# Patient Record
Sex: FEMALE | Race: BLACK OR AFRICAN AMERICAN | Employment: OTHER | URBAN - METROPOLITAN AREA
[De-identification: names, ages, dates, MRNs, and addresses within clinical notes are randomized per-mention and may not be internally consistent; named-entity substitution may affect disease eponyms.]

---

## 2018-03-13 ENCOUNTER — OFFICE VISIT (OUTPATIENT)
Dept: INTERNAL MEDICINE CLINIC | Age: 68
End: 2018-03-13

## 2018-03-13 ENCOUNTER — HOSPITAL ENCOUNTER (OUTPATIENT)
Dept: LAB | Age: 68
Discharge: HOME | End: 2018-03-13

## 2018-03-13 VITALS
RESPIRATION RATE: 16 BRPM | WEIGHT: 188 LBS | BODY MASS INDEX: 28.49 KG/M2 | TEMPERATURE: 98.2 F | HEIGHT: 68 IN | HEART RATE: 49 BPM | DIASTOLIC BLOOD PRESSURE: 75 MMHG | OXYGEN SATURATION: 95 % | SYSTOLIC BLOOD PRESSURE: 119 MMHG

## 2018-03-13 DIAGNOSIS — Z13.1 SCREENING FOR DIABETES MELLITUS: ICD-10-CM

## 2018-03-13 DIAGNOSIS — Z12.11 SCREEN FOR COLON CANCER: ICD-10-CM

## 2018-03-13 DIAGNOSIS — I10 ESSENTIAL HYPERTENSION: Chronic | ICD-10-CM

## 2018-03-13 DIAGNOSIS — Z51.81 MEDICATION MONITORING ENCOUNTER: ICD-10-CM

## 2018-03-13 DIAGNOSIS — E89.0 POSTOPERATIVE HYPOTHYROIDISM: Chronic | ICD-10-CM

## 2018-03-13 DIAGNOSIS — N95.1 MENOPAUSAL STATE: ICD-10-CM

## 2018-03-13 DIAGNOSIS — N95.1 HOT FLASH, MENOPAUSAL: ICD-10-CM

## 2018-03-13 DIAGNOSIS — Z12.31 ENCOUNTER FOR SCREENING MAMMOGRAM FOR BREAST CANCER: ICD-10-CM

## 2018-03-13 DIAGNOSIS — Z13.820 SCREENING FOR OSTEOPOROSIS: ICD-10-CM

## 2018-03-13 DIAGNOSIS — I10 ESSENTIAL HYPERTENSION: Primary | Chronic | ICD-10-CM

## 2018-03-13 DIAGNOSIS — Z83.3 FH: DIABETES MELLITUS: ICD-10-CM

## 2018-03-13 RX ORDER — LEVOTHYROXINE SODIUM 112 UG/1
112 TABLET ORAL
COMMUNITY
Start: 2018-02-16 | End: 2019-03-27 | Stop reason: DRUGHIGH

## 2018-03-13 RX ORDER — HYDROCHLOROTHIAZIDE 12.5 MG/1
12.5 CAPSULE ORAL DAILY
COMMUNITY
Start: 2018-02-16 | End: 2018-09-06 | Stop reason: SDUPTHER

## 2018-03-13 RX ORDER — AMLODIPINE AND BENAZEPRIL HYDROCHLORIDE 5; 20 MG/1; MG/1
1 CAPSULE ORAL DAILY
COMMUNITY
Start: 2018-02-16

## 2018-03-13 NOTE — PROGRESS NOTES
Identified pt with two pt identifiers(name and ). Reviewed record in preparation for visit and have obtained necessary documentation. Chief Complaint   Patient presents with   [address]     pt would like to be tested for DM, as this runs in her family    Migraine     photosensitivity, visual disturbances    Menopause     hot flashes    Stress     causing sleep issues, sleeps maybe 4hrs/night- has trouble falling back asleep        Health Maintenance Due   Topic    Hepatitis C Screening     DTaP/Tdap/Td series (1 - Tdap)    BREAST CANCER SCRN MAMMOGRAM     FOBT Q 1 YEAR AGE 50-75     ZOSTER VACCINE AGE 60>     GLAUCOMA SCREENING Q2Y     Bone Densitometry (Dexa) Screening     Pneumococcal 65+ Low/Medium Risk (1 of 2 - PCV13)    MEDICARE YEARLY EXAM      Coordination of Care Questionnaire:  :   1) Have you been to an emergency room, urgent care clinic since your last visit? no   Hospitalized since your last visit? no             2. Have seen or consulted any other health care provider since your last visit? NO  If yes, where when, and reason for visit? 3) Do you have an Advanced Directive/ Living Will in place? NO  If yes, do we have a copy on file NO  If no, would you like information YES- pt given forms to complete and return to clinic    Patient is accompanied by self I have received verbal consent from Denise Bauer to discuss any/all medical information while they are present in the room.

## 2018-03-13 NOTE — PATIENT INSTRUCTIONS
Learning About Menopause  What is menopause? For most women, menopause is a natural process of aging. Menstrual periods gradually stop. The ability to become pregnant ends. Some women feel relief that their childbearing years are ending. But other women struggle with the physical and emotional changes that come with menopause. For most women, menopause happens around age 48. But every woman's body has its own timeline. Some women stop having periods in their mid-40s. Others keep having periods well into their 50s. And some women go through menopause early because of cancer treatment or surgery to remove the ovaries. What can you expect with menopause? · It starts with perimenopause. This is the process of change that leads up to menopause. Perimenopause can start as early as your late 35s or as late as your early 46s. How long it lasts varies. But it usually lasts from 2 to 8 years. · During this time, your hormone levels will go up and down unevenly (fluctuate). This causes changes in your periods and other symptoms. In time, estrogen and progesterone levels drop enough that the menstrual cycle stops. Going a full year without having a period is usually considered menopause. · Low estrogen levels after menopause speed bone loss. This increases your risk of osteoporosis. Also, your risk of heart disease increases after menopause. · It's normal to have thinner, dryer, wrinkled skin after menopause. The vaginal lining and the lower urinary tract also thin and weaken. This can make it hard to have sex. It can also increase the risk of vaginal and urinary tract infections. What are the symptoms? · Lighter or heavier periods. Your menstrual cycle may be longer or shorter. You may skip periods. · Hot flashes. You may have a sudden feeling of intense body heat. You may sweat, and your head, neck, and chest may get red. Along with hot flashes, you may have a heartbeat that's too fast or not regular.  You may also feel anxious or grouchy. In rare cases you might feel panic. · Trouble sleeping. · Mood swings or feeling grouchy, depressed, or worried. · Problems with remembering or thinking clearly. · Vaginal dryness. Some women have only a few mild symptoms. Others have severe symptoms that disrupt their sleep and daily lives. Symptoms tend to last or get worse the first year or more after menopause. Over time, hormones even out at low levels. Many symptoms improve or go away. But some women may have symptoms that don't go away. How are menopause symptoms treated? ?If you have mild symptoms, you may get some relief if you eat healthy foods, exercise, and lower your stress. Some women choose to take medicines if they have severe symptoms that aren't helped by making changes to their lifestyle. ? Lifestyle changes  ? · Choose a heart-healthy diet that is low in saturated fat. It should include plenty of fish, fruits, vegetables, beans, and high-fiber grains and breads. Be sure you get enough calcium and vitamin D to help your bones stay strong. Low-fat or nonfat dairy products are a great source of calcium. ? · Get regular exercise. Exercise can help you manage your weight, keep your heart and bones strong, and lift your mood. ? · Limit caffeine, alcohol, and stress. These things can make symptoms worse. Limiting them may help you sleep better. ? · If you smoke, stop. Quitting smoking can reduce hot flashes and long-term health risks. Medicines  ? If your symptoms are severe, talk with your doctor. You may want to try prescription medicines, such as:  ? · Low-dose birth control pills before menopause. ? · Low-dose hormone therapy (HT) after menopause. ? · Antidepressants. ? · A medicine called clonidine (Catapres) that is usually used to treat high blood pressure. ? All medicines for menopause symptoms have possible risks or side effects.  A very small number of women develop serious health problems when taking hormone therapy. Be sure to talk to your doctor about your possible health risks before you start a treatment for menopause symptoms. ?Other treatments  ? You can try:  ? · Breathing exercises. They may help reduce hot flashes and emotional symptoms. ? · Soy. Some women feel that eating lots of soy helps even out their menopause symptoms. ? · Yoga or biofeedback. They may help reduce stress. Follow-up care is a key part of your treatment and safety. Be sure to make and go to all appointments, and call your doctor if you are having problems. It's also a good idea to know your test results and keep a list of the medicines you take. Where can you learn more? Go to http://carlyleAridhia Informaticsrommel.info/. Enter H199 in the search box to learn more about \"Learning About Menopause. \"  Current as of: October 13, 2016  Content Version: 11.4  © 7594-4429 [employer]-Art. Care instructions adapted under license by [employer] (which disclaims liability or warranty for this information). If you have questions about a medical condition or this instruction, always ask your healthcare professional. [address] any warranty or liability for your use of this information. Hot Flashes During Menopause: Care Instructions  Your Care Instructions    A hot flash is a sudden feeling of intense body heat. Your head, neck, and chest may get red. Your heartbeat may speed up, and you may feel anxious or irritable. You may find that hot flashes occur more often in warm rooms or during stressful times. Hot flashes and other symptoms are a normal response to the hormone changes that occur before your menstrual cycle goes away completely (menopause). Hot flashes often get better and go away with time. Making a few changes, such as exercising more, practicing meditation, quitting smoking, and drinking less alcohol, can help.  Some women take hormone pills or other medicine to treat bothersome symptoms. Follow-up care is a key part of your treatment and safety. Be sure to make and go to all appointments, and call your doctor if you are having problems. It's also a good idea to know your test results and keep a list of the medicines you take. How can you care for yourself at home? · If you decide to take medicine to treat hot flashes, take it exactly as prescribed. Call your doctor if you think you are having a problem with your medicine. You will get more details on the specific medicine your doctor prescribes. · Learn to meditate. Sit quietly and focus on your breathing. Try to practice each day. Books, classes, and tapes can help you start a program.  · Wear natural fabrics, such as cotton and silk. Dress in layers so you can take off clothes as needed. · Keep the room temperature cool, or use a fan. You are more likely to have a hot flash when you are too warm than when you are cool. · Use fewer blankets when you sleep at night. · Drink cold fluids rather than hot ones. Limit your intake of caffeine and alcohol. · Eat smaller meals more often during the day so your body makes less heat than when digesting large amounts of food. Eat low-fat and high-fiber foods. · Do not smoke. Smoking can make hot flashes worse. If you need help quitting, talk to your doctor about stop-smoking programs and medicines. These can increase your chances of quitting for good. · Get at least 30 minutes of exercise on most days of the week. Walking is a good choice. You also may want to do other activities, such as running, swimming, cycling, or playing tennis or team sports. Where can you learn more? Go to http://carlyle-rommel.info/. Enter F700 in the search box to learn more about \"Hot Flashes During Menopause: Care Instructions. \"  Current as of: October 13, 2016  Content Version: 11.4  © 4176-8829 [employer].  Care instructions adapted under license by [employer] (which disclaims liability or warranty for this information). If you have questions about a medical condition or this instruction, always ask your healthcare professional. [address] any warranty or liability for your use of this information.

## 2018-03-13 NOTE — MR AVS SNAPSHOT
[address] [place] [address] 
 
 
 [address] [facility] [address] [id number] 
[phone] Patient: Mauro Chavis MRN: [id number] UTQ:[id number] Visit Information Date & Time Provider Department Dept. Phone Encounter #  
 3/13/2018  3:30 PM Carolann Ashley, [address] [phone] [id number] Follow-up Instructions Return in about 6 weeks (around 4/24/2018) for Medicare Wellness Visit, htn, thryoid disorder, check on med changes. Upcoming Health Maintenance Date Due Hepatitis C Screening 1950 DTaP/Tdap/Td series (1 - Tdap) 6/3/1972 BREAST CANCER SCRN MAMMOGRAM 6/3/2001 FOBT Q 1 YEAR AGE 50-75 6/3/2001 ZOSTER VACCINE AGE 60> 4/3/2011 GLAUCOMA SCREENING Q2Y 6/3/2016 Bone Densitometry (Dexa) Screening 6/3/2016 Pneumococcal 65+ Low/Medium Risk (1 of 2 - PCV13) 6/3/2016 MEDICARE YEARLY EXAM 6/3/2016 Allergies as of 3/13/2018  Review Complete On: 3/13/2018 By: Carolann Ashley MD  
  
 Severity Noted Reaction Type Reactions Cocoa  07/09/2013    Hives Also becomes dizzy Codeine  07/20/2012    Hives Pcn [Penicillins]  07/20/2012    Hives Current Immunizations  Reviewed on 3/12/2018 Name Date Influenza High Dose Vaccine PF 1/19/2018, 11/8/2016 Not reviewed this visit You Were Diagnosed With   
  
 Codes Comments Essential hypertension    -  Primary ICD-10-CM: I10 
ICD-9-CM: 401.9 Postoperative hypothyroidism     ICD-10-CM: E89.0 ICD-9-CM: 244.0 Hot flash, menopausal     ICD-10-CM: N95.1 ICD-9-CM: 627.2 Screen for colon cancer     ICD-10-CM: Z12.11 ICD-9-CM: V76.51 Encounter for screening mammogram for breast cancer     ICD-10-CM: Z12.31 
ICD-9-CM: V76.12 Menopausal state     ICD-10-CM: N95.1 ICD-9-CM: 627.2 Screening for osteoporosis     ICD-10-CM: Z13.820 ICD-9-CM: V82.81  Medication monitoring encounter     ICD-10-CM: Z51.81 
ICD-9-CM: V58.83   
 Screening for diabetes mellitus     ICD-10-CM: Z13.1 ICD-9-CM: V77.1 FH: diabetes mellitus     ICD-10-CM: Z83.3 ICD-9-CM: V18.0 Vitals BP Pulse Temp Resp Height(growth percentile) Weight(growth percentile) 119/75 (!) 49 98.2 °F (36.8 °C) (Oral) 16 5' 8\" (1.727 m) 188 lb (85.3 kg) SpO2 BMI OB Status Smoking Status 95% 28.59 kg/m2 Menopause Never Smoker Vitals History BMI and BSA Data Body Mass Index Body Surface Area 28.59 kg/m 2 2.02 m 2 Preferred Pharmacy Pharmacy Name Phone [facility] [address] [place] [address] [phone] Your Updated Medication List  
  
   
This list is accurate as of 3/13/18  4:43 PM.  Always use your most recent med list. amLODIPine-benazepril 5-20 mg per capsule Commonly known as:  Willadean Billet Take 1 Cap by mouth daily. hydroCHLOROthiazide 12.5 mg capsule Commonly known as:  Varsha Vanderbilt Take 12.5 mg by mouth daily. levothyroxine 112 mcg tablet Commonly known as:  SYNTHROID Take 112 mcg by mouth Daily (before breakfast). We Performed the Following REFERRAL TO GASTROENTEROLOGY [JQO00 Custom] Follow-up Instructions Return in about 6 weeks (around 4/24/2018) for Medicare Wellness Visit, htn, thryoid disorder, check on med changes. To-Do List   
 03/13/2018 Lab:  CBC WITH AUTOMATED DIFF   
  
 03/13/2018 Imaging:  DEXA BONE DENSITY STUDY AXIAL   
  
 03/13/2018 Lab:  LIPID PANEL Around 03/13/2018 Imaging:  RAVIN MAMMO BI SCREENING INCL CAD   
  
 03/13/2018 Lab:  METABOLIC PANEL, COMPREHENSIVE   
  
 03/13/2018 Lab:  TSH AND FREE T4   
  
 03/13/2018 Lab:  URINALYSIS W/ RFLX MICROSCOPIC   
  
 03/13/2018 Lab:  VITAMIN D, 1, 25 DIHYDROXY Referral Information  Referral ID Referred By Referred To  
  
 [id number] Damion Stone MD   
 [id number] [facility][note 3[facility] [address] [facility] Phone: [phone] Fax: [phone] Visits Status Start Date End Date 1 New Request 3/13/18 3/13/19 If your referral has a status of pending review or denied, additional information will be sent to support the outcome of this decision. Patient Instructions Learning About Menopause What is menopause? For most women, menopause is a natural process of aging. Menstrual periods gradually stop. The ability to become pregnant ends. Some women feel relief that their childbearing years are ending. But other women struggle with the physical and emotional changes that come with menopause. For most women, menopause happens around age 48. But every woman's body has its own timeline. Some women stop having periods in their mid-40s. Others keep having periods well into their 50s. And some women go through menopause early because of cancer treatment or surgery to remove the ovaries. What can you expect with menopause? · It starts with perimenopause. This is the process of change that leads up to menopause. Perimenopause can start as early as your late 35s or as late as your early 46s. How long it lasts varies. But it usually lasts from 2 to 8 years. · During this time, your hormone levels will go up and down unevenly (fluctuate). This causes changes in your periods and other symptoms. In time, estrogen and progesterone levels drop enough that the menstrual cycle stops. Going a full year without having a period is usually considered menopause. · Low estrogen levels after menopause speed bone loss. This increases your risk of osteoporosis. Also, your risk of heart disease increases after menopause. · It's normal to have thinner, dryer, wrinkled skin after menopause. The vaginal lining and the lower urinary tract also thin and weaken. This can make it hard to have sex.  It can also increase the risk of vaginal and urinary tract infections. What are the symptoms? · Lighter or heavier periods. Your menstrual cycle may be longer or shorter. You may skip periods. · Hot flashes. You may have a sudden feeling of intense body heat. You may sweat, and your head, neck, and chest may get red. Along with hot flashes, you may have a heartbeat that's too fast or not regular. You may also feel anxious or grouchy. In rare cases you might feel panic. · Trouble sleeping. · Mood swings or feeling grouchy, depressed, or worried. · Problems with remembering or thinking clearly. · Vaginal dryness. Some women have only a few mild symptoms. Others have severe symptoms that disrupt their sleep and daily lives. Symptoms tend to last or get worse the first year or more after menopause. Over time, hormones even out at low levels. Many symptoms improve or go away. But some women may have symptoms that don't go away. How are menopause symptoms treated? ?If you have mild symptoms, you may get some relief if you eat healthy foods, exercise, and lower your stress. Some women choose to take medicines if they have severe symptoms that aren't helped by making changes to their lifestyle. ? Lifestyle changes ? · Choose a heart-healthy diet that is low in saturated fat. It should include plenty of fish, fruits, vegetables, beans, and high-fiber grains and breads. Be sure you get enough calcium and vitamin D to help your bones stay strong. Low-fat or nonfat dairy products are a great source of calcium. ? · Get regular exercise. Exercise can help you manage your weight, keep your heart and bones strong, and lift your mood. ? · Limit caffeine, alcohol, and stress. These things can make symptoms worse. Limiting them may help you sleep better. ? · If you smoke, stop. Quitting smoking can reduce hot flashes and long-term health risks. Medicines ? If your symptoms are severe, talk with your doctor. You may want to try prescription medicines, such as: ? · Low-dose birth control pills before menopause. ? · Low-dose hormone therapy (HT) after menopause. ? · Antidepressants. ? · A medicine called clonidine (Catapres) that is usually used to treat high blood pressure. ? All medicines for menopause symptoms have possible risks or side effects. A very small number of women develop serious health problems when taking hormone therapy. Be sure to talk to your doctor about your possible health risks before you start a treatment for menopause symptoms. ?Other treatments ? You can try: 
? · Breathing exercises. They may help reduce hot flashes and emotional symptoms. ? · Soy. Some women feel that eating lots of soy helps even out their menopause symptoms. ? · Yoga or biofeedback. They may help reduce stress. Follow-up care is a key part of your treatment and safety. Be sure to make and go to all appointments, and call your doctor if you are having problems. It's also a good idea to know your test results and keep a list of the medicines you take. Where can you learn more? Go to http://carlyle-rommel.info/. Enter H199 in the search box to learn more about \"Learning About Menopause. \" Current as of: October 13, 2016 Content Version: 11.4 © 5074-8696 [employer]. Care instructions adapted under license by [employer] (which disclaims liability or warranty for this information). If you have questions about a medical condition or this instruction, always ask your healthcare professional. [place] [address] any warranty or liability for your use of this information. Hot Flashes During Menopause: Care Instructions Your Care Instructions A hot flash is a sudden feeling of intense body heat. Your head, neck, and chest may get red. Your heartbeat may speed up, and you may feel anxious or irritable.  You may find that hot flashes occur more often in warm rooms or during stressful times. Hot flashes and other symptoms are a normal response to the hormone changes that occur before your menstrual cycle goes away completely (menopause). Hot flashes often get better and go away with time. Making a few changes, such as exercising more, practicing meditation, quitting smoking, and drinking less alcohol, can help. Some women take hormone pills or other medicine to treat bothersome symptoms. Follow-up care is a key part of your treatment and safety. Be sure to make and go to all appointments, and call your doctor if you are having problems. It's also a good idea to know your test results and keep a list of the medicines you take. How can you care for yourself at home? · If you decide to take medicine to treat hot flashes, take it exactly as prescribed. Call your doctor if you think you are having a problem with your medicine. You will get more details on the specific medicine your doctor prescribes. · Learn to meditate. Sit quietly and focus on your breathing. Try to practice each day. Books, classes, and tapes can help you start a program. 
· Wear natural fabrics, such as cotton and silk. Dress in layers so you can take off clothes as needed. · Keep the room temperature cool, or use a fan. You are more likely to have a hot flash when you are too warm than when you are cool. · Use fewer blankets when you sleep at night. · Drink cold fluids rather than hot ones. Limit your intake of caffeine and alcohol. · Eat smaller meals more often during the day so your body makes less heat than when digesting large amounts of food. Eat low-fat and high-fiber foods. · Do not smoke. Smoking can make hot flashes worse. If you need help quitting, talk to your doctor about stop-smoking programs and medicines. These can increase your chances of quitting for good. · Get at least 30 minutes of exercise on most days of the week.  Walking is a good choice. You also may want to do other activities, such as running, swimming, cycling, or playing tennis or team sports. Where can you learn more? Go to http://carlyle-rommel.info/. Enter F700 in the search box to learn more about \"Hot Flashes During Menopause: Care Instructions. \" Current as of: October 13, 2016 Content Version: 11.4 © 5968-2443 [employer]. Care instructions adapted under license by [employer] (which disclaims liability or warranty for this information). If you have questions about a medical condition or this instruction, always ask your healthcare professional. [place] [address] any warranty or liability for your use of this information. Introducing [facility]! Rob Ross introduces YoBucko patient portal. Now you can access parts of your medical record, email your doctor's office, and request medication refills online. 1. In your internet browser, go to [email or web address] 2. Click on the First Time User? Click Here link in the Sign In box. You will see the New Member Sign Up page. 3. Enter your YoBucko Access Code exactly as it appears below. You will not need to use this code after youve completed the sign-up process. If you do not sign up before the expiration date, you must request a new code. · YoBucko Access Code: [email or web address] Expires: 6/11/2018  4:43 PM 
 
4. Enter the last four digits of your Social Security Number (xxxx) and Date of Birth (mm/dd/yyyy) as indicated and click Submit. You will be taken to the next sign-up page. 5. Create a Hoojat ID. This will be your YoBucko login ID and cannot be changed, so think of one that is secure and easy to remember. 6. Create a YoBucko password. You can change your password at any time. 7. Enter your Password Reset Question and Answer. This can be used at a later time if you forget your password. 8. Enter your e-mail address. You will receive e-mail notification when new information is available in [address] 9. Click Sign Up. You can now view and download portions of your medical record. 10. Click the Download Summary menu link to download a portable copy of your medical information. If you have questions, please visit the Frequently Asked Questions section of the Pocits website. Remember, Pocits is NOT to be used for urgent needs. For medical emergencies, dial 911. Now available from your iPhone and Android! Please provide this summary of care documentation to your next provider. If you have any questions after today's visit, please call [phone].

## 2018-03-13 NOTE — PROGRESS NOTES
HISTORY OF PRESENT ILLNESS  Josh Kennedy is a 78 y.o. female. Visit Vitals    /75    Pulse (!) 49    Temp 98.2 °F (36.8 °C) (Oral)    Resp 16    Ht 5' 8\" (1.727 m)    Wt 188 lb (85.3 kg)    SpO2 95%    BMI 28.59 kg/m2       HPI Comments: Wishes to have a \"new start\" with a new doctor. Has not had a check up in several years. Menopause sxs  Sleeplessness--trouble getting to sleep, trouble staying asleep. Thinks this is largely stress  Moodiness  Hot flashes are particularly worrisome. Has not taken any medication for these. Would like diabetes check as it runs in her family. She has no current sxs. Establish Care   The history is provided by the patient. This is a new problem. Migraine    The history is provided by the patient. This is a new problem. Menopause   The history is provided by the patient (see comments). This is a new problem. The current episode started more than 1 week ago. The problem occurs daily. The problem has not changed since onset. Stress   The history is provided by the patient (family and job stress. ). This is a new problem. Review of Systems   Constitutional: Negative. HENT: Negative. Eyes: Negative. Respiratory: Negative. Cardiovascular: Negative. Genitourinary:        Hot flashes   Neurological: Negative. Psychiatric/Behavioral: The patient has insomnia. Moodiness. Poor sleep       Physical Exam    ASSESSMENT and PLAN    ICD-10-CM ICD-9-CM    1. Essential hypertension A03 668.4 METABOLIC PANEL, COMPREHENSIVE      LIPID PANEL      URINALYSIS W/ RFLX MICROSCOPIC      TSH AND FREE T4   2. Postoperative hypothyroidism E89.0 244.0 TSH AND FREE T4      VITAMIN D, 1, 25 DIHYDROXY   3. Hot flash, menopausal T04.6 241.1 METABOLIC PANEL, COMPREHENSIVE      TSH AND FREE T4      VITAMIN D, 1, 25 DIHYDROXY   4. Screen for colon cancer Z12.11 V76.51 REFERRAL TO GASTROENTEROLOGY      CBC WITH AUTOMATED DIFF   5.  Encounter for screening mammogram for breast cancer Z12.31 V76.12 RAVIN MAMMO BI SCREENING INCL CAD   6. Menopausal state N95.1 627.2 DEXA BONE DENSITY STUDY AXIAL   7. Screening for osteoporosis Z13.820 V82.81 DEXA BONE DENSITY STUDY AXIAL   8. Medication monitoring encounter X05.25 E28.60 METABOLIC PANEL, COMPREHENSIVE      CBC WITH AUTOMATED DIFF   9. Screening for diabetes mellitus Z13.1 V77.1    10. FH: diabetes mellitus Z83.3 V18.0        Past medical history, surgical history, family history, and social history reviewed with patient    BP controlled    Needs updated lab and other health maintenance items    will need to return for [address]    Discussed OTC meds for menopause    Discussed BMI/weight, lifestyle, diet and exercise. Discussed effect on blood pressure, blood sugar, and joints especially  Focus on limiting white carbs, portion control, and moving more. Discussed metabolism sugar as it relates to menopause    Discussed sleep issues as well. If menopause sxs improve, this may improve as well. Discussed stress as a contributor. Pt does not want a prescription medication for sleep or stress. Would like to try natural remedies.     F/u 6 weeks for [address]

## 2018-03-15 LAB
1,25(OH)2D3 SERPL-MCNC: 40.6 PG/ML (ref 19.9–79.3)
ALBUMIN SERPL-MCNC: 4.6 G/DL (ref 3.6–4.8)
ALBUMIN/GLOB SERPL: 1.2 {RATIO} (ref 1.2–2.2)
ALP SERPL-CCNC: 114 IU/L (ref 39–117)
ALT SERPL-CCNC: 18 IU/L (ref 0–32)
AST SERPL-CCNC: 17 IU/L (ref 0–40)
BASOPHILS # BLD AUTO: 0 X10E3/UL (ref 0–0.2)
BASOPHILS NFR BLD AUTO: 0 %
BILIRUB SERPL-MCNC: 0.2 MG/DL (ref 0–1.2)
BUN SERPL-MCNC: 16 MG/DL (ref 8–27)
BUN/CREAT SERPL: 19 (ref 12–28)
CALCIUM SERPL-MCNC: 9.8 MG/DL (ref 8.7–10.3)
CHLORIDE SERPL-SCNC: 96 MMOL/L (ref 96–106)
CHOLEST SERPL-MCNC: 264 MG/DL (ref 100–199)
CO2 SERPL-SCNC: 29 MMOL/L (ref 18–29)
CREAT SERPL-MCNC: 0.86 MG/DL (ref 0.57–1)
EOSINOPHIL # BLD AUTO: 0.1 X10E3/UL (ref 0–0.4)
EOSINOPHIL NFR BLD AUTO: 1 %
ERYTHROCYTE [DISTWIDTH] IN BLOOD BY AUTOMATED COUNT: 14.6 % (ref 12.3–15.4)
GFR SERPLBLD CREATININE-BSD FMLA CKD-EPI: 71 ML/MIN/1.73
GFR SERPLBLD CREATININE-BSD FMLA CKD-EPI: 81 ML/MIN/1.73
GLOBULIN SER CALC-MCNC: 4 G/DL (ref 1.5–4.5)
GLUCOSE SERPL-MCNC: 102 MG/DL (ref 65–99)
GLUCOSE UR QL: NORMAL
HCT VFR BLD AUTO: 36.2 % (ref 34–46.6)
HDLC SERPL-MCNC: 70 MG/DL
HGB BLD-MCNC: 12.6 G/DL (ref 11.1–15.9)
IMM GRANULOCYTES # BLD: 0 X10E3/UL (ref 0–0.1)
IMM GRANULOCYTES NFR BLD: 0 %
INTERPRETATION, 910389: NORMAL
KETONES UR QL STRIP: NORMAL
LDLC SERPL CALC-MCNC: 166 MG/DL (ref 0–99)
LYMPHOCYTES # BLD AUTO: 2.8 X10E3/UL (ref 0.7–3.1)
LYMPHOCYTES NFR BLD AUTO: 34 %
MCH RBC QN AUTO: 26.6 PG (ref 26.6–33)
MCHC RBC AUTO-ENTMCNC: 34.8 G/DL (ref 31.5–35.7)
MCV RBC AUTO: 76 FL (ref 79–97)
MONOCYTES # BLD AUTO: 0.5 X10E3/UL (ref 0.1–0.9)
MONOCYTES NFR BLD AUTO: 6 %
NEUTROPHILS # BLD AUTO: 4.8 X10E3/UL (ref 1.4–7)
NEUTROPHILS NFR BLD AUTO: 59 %
PH UR STRIP: NORMAL [PH]
PLATELET # BLD AUTO: 299 X10E3/UL (ref 150–379)
POTASSIUM SERPL-SCNC: 3.6 MMOL/L (ref 3.5–5.2)
PROT SERPL-MCNC: 8.6 G/DL (ref 6–8.5)
PROT UR QL STRIP: NORMAL
RBC # BLD AUTO: 4.74 X10E6/UL (ref 3.77–5.28)
SODIUM SERPL-SCNC: 139 MMOL/L (ref 134–144)
SP GR UR: NORMAL
T4 FREE SERPL-MCNC: 1.4 NG/DL (ref 0.82–1.77)
TRIGL SERPL-MCNC: 138 MG/DL (ref 0–149)
TSH SERPL DL<=0.005 MIU/L-ACNC: 5.53 UIU/ML (ref 0.45–4.5)
VLDLC SERPL CALC-MCNC: 28 MG/DL (ref 5–40)
WBC # BLD AUTO: 8.2 X10E3/UL (ref 3.4–10.8)

## 2018-04-02 ENCOUNTER — HOSPITAL ENCOUNTER (OUTPATIENT)
Dept: MAMMOGRAPHY | Age: 68
Discharge: HOME | End: 2018-04-02
Attending: INTERNAL MEDICINE
Payer: MEDICARE

## 2018-04-02 ENCOUNTER — HOSPITAL ENCOUNTER (OUTPATIENT)
Dept: GENERAL RADIOLOGY | Age: 68
Discharge: HOME | End: 2018-04-02
Attending: INTERNAL MEDICINE
Payer: MEDICARE

## 2018-04-02 DIAGNOSIS — N95.1 MENOPAUSAL STATE: ICD-10-CM

## 2018-04-02 DIAGNOSIS — Z13.820 SCREENING FOR OSTEOPOROSIS: ICD-10-CM

## 2018-04-02 DIAGNOSIS — Z12.31 ENCOUNTER FOR SCREENING MAMMOGRAM FOR BREAST CANCER: ICD-10-CM

## 2018-04-02 PROCEDURE — 77063 BREAST TOMOSYNTHESIS BI: CPT

## 2018-04-02 PROCEDURE — 77080 DXA BONE DENSITY AXIAL: CPT

## 2018-06-28 ENCOUNTER — OFFICE VISIT (OUTPATIENT)
Dept: INTERNAL MEDICINE CLINIC | Age: 68
End: 2018-06-28

## 2018-06-28 RX ORDER — HYDROCHLOROTHIAZIDE 12.5 MG/1
12.5 CAPSULE ORAL DAILY
Status: CANCELLED | OUTPATIENT
Start: 2018-06-28

## 2018-06-28 NOTE — PROGRESS NOTES
ROOM # 6    Elena Miller presents today for   Chief Complaint   Patient presents with    Annual Wellness Visit       Elena Miller preferred language for health care discussion is english/other. Is someone accompanying this pt? no    Is the patient using any DME equipment during OV? no    Depression Screening:  PHQ over the last two weeks 6/28/2018 3/13/2018   Little interest or pleasure in doing things Several days Not at all   Feeling down, depressed or hopeless Several days Several days   Total Score PHQ 2 2 1       Learning Assessment:  Learning Assessment 3/13/2018   PRIMARY LEARNER Patient   HIGHEST LEVEL OF EDUCATION - PRIMARY LEARNER  4 YEARS OF COLLEGE   BARRIERS PRIMARY LEARNER NONE   CO-LEARNER CAREGIVER No   PRIMARY LANGUAGE ENGLISH    NEED No   LEARNER PREFERENCE PRIMARY OTHER (COMMENT)   LEARNING SPECIAL TOPICS no   ANSWERED BY self   RELATIONSHIP SELF   ASSESSMENT COMMENT no       Abuse Screening:  Abuse Screening Questionnaire 6/28/2018 3/13/2018   Do you ever feel afraid of your partner? N N   Are you in a relationship with someone who physically or mentally threatens you? N N   Is it safe for you to go home?  Onur Gonsalez       ADL Assessment:  ADL Assessment 6/28/2018   Feeding yourself No Help Needed   Getting from bed to chair No Help Needed   Getting dressed No Help Needed   Bathing or showering No Help Needed   Walk across the room (includes cane/walker) No Help Needed   Using the telphone No Help Needed   Taking your medications No Help Needed   Preparing meals No Help Needed   Managing money (expenses/bills) No Help Needed   Moderately strenuous housework (laundry) No Help Needed   Shopping for personal items (toiletries/medicines) No Help Needed   Shopping for groceries No Help Needed   Driving No Help Needed   Climbing a flight of stairs No Help Needed   Getting to places beyond walking distances No Help Needed       Fall Risk:  Fall Risk Assessment, last 12 mths 6/28/2018 3/13/2018 Able to walk? Yes Yes   Fall in past 12 months? No No       Health Maintenance reviewed and discussed per provider. Yes    Zula Phoenix is due for   Health Maintenance Due   Topic Date Due    Hepatitis C Screening  1950    DTaP/Tdap/Td series (1 - Tdap) 06/03/1972    FOBT Q 1 YEAR AGE 50-75  06/03/2001    ZOSTER VACCINE AGE 60>  04/03/2011    GLAUCOMA SCREENING Q2Y  06/03/2016    Pneumococcal 65+ Low/Medium Risk (1 of 2 - PCV13) 06/03/2016    MEDICARE YEARLY EXAM  03/14/2018     Please order/place referral if appropriate. Advance Directive:  1. Do you have an advance directive in place? Patient Reply: no    2. If not, would you like material regarding how to put one in place? Patient Reply: no    Coordination of Care:  1. Have you been to the ER, urgent care clinic since your last visit? Hospitalized since your last visit? no    2. Have you seen or consulted any other health care providers outside of the [address] [place] [address] since your last visit? Include any pap smears or colon screening.  no

## 2018-07-27 ENCOUNTER — ANESTHESIA EVENT (OUTPATIENT)
Dept: ENDOSCOPY | Age: 68
End: 2018-07-27
Payer: MEDICARE

## 2018-07-27 NOTE — PERIOP NOTES
PAT - SURGICAL PRE-ADMISSION INSTRUCTIONS    NAME:  Jose Tinoco                                                          TODAY'S DATE:  7/27/2018    SURGERY DATE:  7/30/2018                                  SURGERY ARRIVAL TIME:   0945    1. Do NOT eat or drink anything, including candy or gum, after MIDNIGHT on 07/29/2018 , unless you have specific instructions from your Surgeon or Anesthesia Provider to do so. 2. No smoking on the day of surgery. 3. No alcohol 24 hours prior to the day of surgery. 4. No recreational drugs for one week prior to the day of surgery. 5. Leave all valuables, including money/purse, at home. 6. Remove all jewelry, nail polish, makeup (including mascara); no lotions, powders, deodorant, or perfume/cologne/after shave. 7. Glasses/Contact lenses and Dentures may be worn to the hospital.  They will be removed prior to surgery. 8. Call your doctor if symptoms of a cold or illness develop within 24 ours prior to surgery. 9. AN ADULT MUST DRIVE YOU HOME AFTER OUTPATIENT SURGERY. 10. If you are having an OUTPATIENT procedure, please make arrangements for a responsible adult to be with you for 24 hours after your surgery. 11. If you are admitted to the hospital, you will be assigned to a bed after surgery is complete. Normally a family member will not be able to see you until you are in your assigned bed. 15. Family is encouraged to accompany you to the hospital.  We ask visitors in the treatment area to be limited to ONE person at a time to ensure patient privacy. EXCEPTIONS WILL BE MADE AS NEEDED. 15. Children under 12 are discouraged from entering the treatment area and need to be supervised by an adult when in the waiting room. Special Instructions: Take these medications the morning of surgery with a sip of water:  Synthroid, Bring any pertinent legal medical records. , Complete bowel prep per MD instructions., STOP anticoagulants AT LEAST 1 WEEK PRIOR to your surgery or, follow other MD instructions:  No NSAIDS          These surgical instructions were reviewed with Estela Agustin during the PAT phone call    Directions: On the morning of surgery, please go to the [facility]. Enter the building from the [facility] entrance, 1st floor (next to the Emergency Room entrance). Take the elevator to the 2nd floor. Sign in at the Registration Desk.     If you have any questions and/or concerns, please do not hesitate to call:  (Prior to the day of surgery)  \[facility]\"" unit:  [phone]  (Day of surgery)  [facility] unit:  [phone]

## 2018-07-30 ENCOUNTER — ANESTHESIA (OUTPATIENT)
Dept: ENDOSCOPY | Age: 68
End: 2018-07-30
Payer: MEDICARE

## 2018-07-30 ENCOUNTER — HOSPITAL ENCOUNTER (OUTPATIENT)
Age: 68
Setting detail: OUTPATIENT SURGERY
Discharge: HOME | End: 2018-07-30
Attending: INTERNAL MEDICINE | Admitting: INTERNAL MEDICINE
Payer: MEDICARE

## 2018-07-30 VITALS
DIASTOLIC BLOOD PRESSURE: 74 MMHG | HEIGHT: 68 IN | HEART RATE: 110 BPM | TEMPERATURE: 98 F | RESPIRATION RATE: 18 BRPM | SYSTOLIC BLOOD PRESSURE: 116 MMHG | WEIGHT: 187 LBS | BODY MASS INDEX: 28.34 KG/M2 | OXYGEN SATURATION: 97 %

## 2018-07-30 LAB
ATRIAL RATE: 55 BPM
BUN BLD-MCNC: 7 MG/DL (ref 7–18)
CALCULATED P AXIS, ECG09: 24 DEGREES
CALCULATED R AXIS, ECG10: 24 DEGREES
CALCULATED T AXIS, ECG11: -5 DEGREES
CHLORIDE BLD-SCNC: 102 MMOL/L (ref 100–108)
DIAGNOSIS, 93000: NORMAL
GLUCOSE BLD STRIP.AUTO-MCNC: 100 MG/DL (ref 74–106)
HCT VFR BLD CALC: 35 % (ref 36–49)
HGB BLD-MCNC: 11.9 G/DL (ref 12–16)
P-R INTERVAL, ECG05: 158 MS
POTASSIUM BLD-SCNC: 4 MMOL/L (ref 3.5–5.5)
Q-T INTERVAL, ECG07: 456 MS
QRS DURATION, ECG06: 66 MS
QTC CALCULATION (BEZET), ECG08: 436 MS
SODIUM BLD-SCNC: 140 MMOL/L (ref 136–145)
VENTRICULAR RATE, ECG03: 55 BPM

## 2018-07-30 PROCEDURE — 77030031670 HC DEV INFL ENDOTEK BIG60 MRTM -B: Performed by: INTERNAL MEDICINE

## 2018-07-30 PROCEDURE — 93005 ELECTROCARDIOGRAM TRACING: CPT

## 2018-07-30 PROCEDURE — 84295 ASSAY OF SERUM SODIUM: CPT

## 2018-07-30 PROCEDURE — 74011250636 HC RX REV CODE- 250/636

## 2018-07-30 PROCEDURE — 76040000019: Performed by: INTERNAL MEDICINE

## 2018-07-30 PROCEDURE — 74011250636 HC RX REV CODE- 250/636: Performed by: NURSE ANESTHETIST, CERTIFIED REGISTERED

## 2018-07-30 PROCEDURE — 76060000031 HC ANESTHESIA FIRST 0.5 HR: Performed by: INTERNAL MEDICINE

## 2018-07-30 PROCEDURE — 77030038604 HC SNR ENDO EXACTO USEN -B: Performed by: INTERNAL MEDICINE

## 2018-07-30 PROCEDURE — 88305 TISSUE EXAM BY PATHOLOGIST: CPT | Performed by: INTERNAL MEDICINE

## 2018-07-30 RX ORDER — LIDOCAINE HYDROCHLORIDE 10 MG/ML
0.1 INJECTION, SOLUTION EPIDURAL; INFILTRATION; INTRACAUDAL; PERINEURAL AS NEEDED
Status: DISCONTINUED | OUTPATIENT
Start: 2018-07-30 | End: 2018-07-30 | Stop reason: HOSPADM

## 2018-07-30 RX ORDER — SODIUM CHLORIDE 0.9 % (FLUSH) 0.9 %
5-10 SYRINGE (ML) INJECTION EVERY 8 HOURS
Status: CANCELLED | OUTPATIENT
Start: 2018-07-30 | End: 2018-07-30

## 2018-07-30 RX ORDER — SODIUM CHLORIDE 0.9 % (FLUSH) 0.9 %
5-10 SYRINGE (ML) INJECTION EVERY 8 HOURS
Status: DISCONTINUED | OUTPATIENT
Start: 2018-07-30 | End: 2018-07-30 | Stop reason: HOSPADM

## 2018-07-30 RX ORDER — DEXTROMETHORPHAN/PSEUDOEPHED 2.5-7.5/.8
1.2 DROPS ORAL
Status: CANCELLED | OUTPATIENT
Start: 2018-07-30

## 2018-07-30 RX ORDER — PROPOFOL 10 MG/ML
INJECTION, EMULSION INTRAVENOUS AS NEEDED
Status: DISCONTINUED | OUTPATIENT
Start: 2018-07-30 | End: 2018-07-30 | Stop reason: HOSPADM

## 2018-07-30 RX ORDER — SODIUM CHLORIDE, SODIUM LACTATE, POTASSIUM CHLORIDE, CALCIUM CHLORIDE 600; 310; 30; 20 MG/100ML; MG/100ML; MG/100ML; MG/100ML
50 INJECTION, SOLUTION INTRAVENOUS CONTINUOUS
Status: DISCONTINUED | OUTPATIENT
Start: 2018-07-30 | End: 2018-07-30 | Stop reason: HOSPADM

## 2018-07-30 RX ORDER — FLUMAZENIL 0.1 MG/ML
0.2 INJECTION INTRAVENOUS
Status: CANCELLED | OUTPATIENT
Start: 2018-07-30 | End: 2018-07-30

## 2018-07-30 RX ORDER — EPINEPHRINE 0.1 MG/ML
1 INJECTION INTRACARDIAC; INTRAVENOUS
Status: CANCELLED | OUTPATIENT
Start: 2018-07-30 | End: 2018-07-31

## 2018-07-30 RX ORDER — SODIUM CHLORIDE 0.9 % (FLUSH) 0.9 %
5-10 SYRINGE (ML) INJECTION AS NEEDED
Status: DISCONTINUED | OUTPATIENT
Start: 2018-07-30 | End: 2018-07-30 | Stop reason: HOSPADM

## 2018-07-30 RX ORDER — SODIUM CHLORIDE 0.9 % (FLUSH) 0.9 %
5-10 SYRINGE (ML) INJECTION AS NEEDED
Status: CANCELLED | OUTPATIENT
Start: 2018-07-30 | End: 2018-07-30

## 2018-07-30 RX ORDER — NALOXONE HYDROCHLORIDE 0.4 MG/ML
0.4 INJECTION, SOLUTION INTRAMUSCULAR; INTRAVENOUS; SUBCUTANEOUS
Status: CANCELLED | OUTPATIENT
Start: 2018-07-30 | End: 2018-07-30

## 2018-07-30 RX ORDER — ATROPINE SULFATE 0.1 MG/ML
0.5 INJECTION INTRAVENOUS
Status: CANCELLED | OUTPATIENT
Start: 2018-07-30 | End: 2018-07-31

## 2018-07-30 RX ADMIN — SODIUM CHLORIDE, SODIUM LACTATE, POTASSIUM CHLORIDE, AND CALCIUM CHLORIDE 50 ML/HR: 600; 310; 30; 20 INJECTION, SOLUTION INTRAVENOUS at 10:57

## 2018-07-30 RX ADMIN — PROPOFOL 20 MG: 10 INJECTION, EMULSION INTRAVENOUS at 11:12

## 2018-07-30 RX ADMIN — PROPOFOL 80 MG: 10 INJECTION, EMULSION INTRAVENOUS at 11:10

## 2018-07-30 RX ADMIN — PROPOFOL 20 MG: 10 INJECTION, EMULSION INTRAVENOUS at 11:18

## 2018-07-30 RX ADMIN — PROPOFOL 20 MG: 10 INJECTION, EMULSION INTRAVENOUS at 11:16

## 2018-07-30 RX ADMIN — PROPOFOL 20 MG: 10 INJECTION, EMULSION INTRAVENOUS at 11:20

## 2018-07-30 RX ADMIN — PROPOFOL 20 MG: 10 INJECTION, EMULSION INTRAVENOUS at 11:22

## 2018-07-30 RX ADMIN — PROPOFOL 20 MG: 10 INJECTION, EMULSION INTRAVENOUS at 11:14

## 2018-07-30 NOTE — PROCEDURES
Colonoscopy Report    Patient: Mario Mcconnell MRN: [id number]  SSN: [id number]    YOB: 1950  Age: 80 y.o. Sex: female      Date of Procedure: 7/30/2018    IMPRESSION:  1. 5mm sessile cecal polyp removed  2. Internal and external hemorrhoids  3. Otherwise normal colon    RECOMMENDATIONS:  1. Resume regular diet, Recommend high fiber. 2. Will contact with polyp results in 2 weeks. These results will determine timing to next screening. Patient will be instructed to contact our office if they have not received the results by three weeks. Indication:  Hematochezia/melena  Procedure Performed: Colonoscopy polypectomy (cold snare)  Endoscopist: Chris Recinos MD  Assistant: Endoscopy Technician-1: Rom Ashby  Endoscopy RN-1: Iza Gutierrez RN  Endoscopy RN-2: Ese Geronimo RN  ASA: Per Anesthesia  Mallampati Score: See anesthesia report  Anesthesia: MAC anesthesia  Endoscope:     [x]  CF H 190AL   []  PCF H190AL   []  GIF H 190    Extent of Examination:terminal ileum  Quality of Preparation:     [x]  Excellent   []  Very Good   [] Fair but adequate   [] Fair, inadequate   []  Poor      Technique: The procedure was discussed with the patient including risks, benefits, alternatives including risks of IV sedation, bleeding, perforation and missed polyp. A safety timeout was performed. The patient was given incremental doses of intravenous sedation to achieve moderate sedation. The patients vital signs were monitored at all times including heart rate, rhythm, blood pressure and oxygen saturation. The patient was placed in left lateral position. When adequate sedation was achieved a perianal inspection and a digital rectal exam were performed. Video colonoscope was introduced into the rectum and advanced under direct vision up to the terminal ileum. The cecum was identified by IC valve, appendiceal orifice and crows foot.  With adequate insufflation and maneuvering of the withdrawing scope, the colonic mucosa was visualized carefully. Retroflexion was performed in the rectum and the distal rectum visualized. The patient tolerated the procedure very well and was transferred to recovery area. Findings:  1. External hemorrhoidal skin tag noted on rectal examination, small internal hemorrhoids noted in the anorectum endoscopically  2. A single 5mm sessile cecal polyp was removed with cold snare polypectomy  3. The colonic mucosa was otherwise normal without inflammatory changes, mass, or vascular abnormality  4.  The terminal ileum was was intubated and evaluated for the distal 2-3cm and appeared normal      EBL:Minimal  Specimen:   ID Type Source Tests Collected by Time Destination   1 : cold snare polyp Preservative Cecum  Norris Rodriguez MD 7/30/2018 1116 Pathology       Norris Rodriguez MD  July 30, 2018  11:29 AM

## 2018-07-30 NOTE — IP AVS SNAPSHOT
[address] [place] [address] Irene Ren  
[phone] Patient: Darline Espana MRN: [id number] QAB: About your hospitalization You were admitted on:  2018 You last received care in the:  [facility] PHASE 2 RECOVERY You were discharged on:  2018 Why you were hospitalized Your primary diagnosis was:  Not on File Follow-up Information Follow up With Details Comments Contact Info Cristopher Chen MD Call in 2 weeks for pathology results [address] [place] [address] [address] [address] 
[phone] Joanne Velasco MD   [address] [facility] 100 42 [facility] 83 [address] 
[phone] Discharge Orders None A check carmita indicates which time of day the medication should be taken. My Medications CONTINUE taking these medications Instructions Each Dose to Equal  
 Morning Noon Evening Bedtime  
 amLODIPine-benazepril 5-20 mg per capsule Commonly known as:  Herminia Moellers Your last dose was: Your next dose is: Take 1 Cap by mouth daily. 1 Cap  
    
   
   
   
  
 hydroCHLOROthiazide 12.5 mg capsule Commonly known as:  Ferdie Cea Your last dose was: Your next dose is: Take 12.5 mg by mouth daily. 12.5 mg  
    
   
   
   
  
 levothyroxine 112 mcg tablet Commonly known as:  SYNTHROID Your last dose was: Your next dose is: Take 112 mcg by mouth Daily (before breakfast). 112 mcg Discharge Instructions Patient Discharge Instructions Darline Espana / [id number] : 1950 Admitted 2018 Discharged: 2018  
 
 ____ IMPRESSION: 
1. 5mm sessile cecal polyp removed 2. Internal and external hemorrhoids 3. Otherwise normal colon RECOMMENDATIONS: 
1. Resume regular diet, Recommend high fiber. 2. Will contact with polyp results in 2 weeks. These results will determine timing to next screening. Patient will be instructed to contact our office if they have not received the results by three weeks. 3. Please contact our office if you have not received the results by three weeks. Recommended Diet: Regular Diet Recommended Activity: 1. Do not drink alcohol, drive or operate machinery for 12 hours 2. Call if any fever, abdominal pain or bleeding noted. Signed By: Isabel Koroma MD   
 July 30, 2018 DISCHARGE SUMMARY from Nurse PATIENT INSTRUCTIONS: 
 
After general anesthesia or intravenous sedation, for 24 hours or while taking prescription Narcotics: · Limit your activities · Do not drive and operate hazardous machinery · Do not make important personal or business decisions · Do  not drink alcoholic beverages · If you have not urinated within 8 hours after discharge, please contact your surgeon on call. Report the following to your surgeon: 
· Excessive pain, swelling, redness or odor of or around the surgical area · Temperature over 100.5 · Nausea and vomiting lasting longer than 4 hours or if unable to take medications · Any signs of decreased circulation or nerve impairment to extremity: change in color, persistent  numbness, tingling, coldness or increase pain · Any questions What to do at Home: 
Recommended activity: Activity as tolerated and no driving for today, These are general instructions for a healthy lifestyle: No smoking/ No tobacco products/ Avoid exposure to second hand smoke Surgeon General's Warning:  Quitting smoking now greatly reduces serious risk to your health. Obesity, smoking, and sedentary lifestyle greatly increases your risk for illness A healthy diet, regular physical exercise & weight monitoring are important for maintaining a healthy lifestyle You may be retaining fluid if you have a history of heart failure or if you experience any of the following symptoms:  Weight gain of 3 pounds or more overnight or 5 pounds in a week, increased swelling in our hands or feet or shortness of breath while lying flat in bed. Please call your doctor as soon as you notice any of these symptoms; do not wait until your next office visit. Recognize signs and symptoms of STROKE: 
 
F-face looks uneven A-arms unable to move or move unevenly S-speech slurred or non-existent T-time-call 911 as soon as signs and symptoms begin-DO NOT go Back to bed or wait to see if you get better-TIME IS BRAIN. Warning Signs of HEART ATTACK Call 911 if you have these symptoms: 
? Chest discomfort. Most heart attacks involve discomfort in the center of the chest that lasts more than a few minutes, or that goes away and comes back. It can feel like uncomfortable pressure, squeezing, fullness, or pain. ? Discomfort in other areas of the upper body. Symptoms can include pain or discomfort in one or both arms, the back, neck, jaw, or stomach. ? Shortness of breath with or without chest discomfort. ? Other signs may include breaking out in a cold sweat, nausea, or lightheadedness. Don't wait more than five minutes to call [address]! Fast action can save your life. Calling 911 is almost always the fastest way to get lifesaving treatment. Emergency Medical Services staff can begin treatment when they arrive  up to an hour sooner than if someone gets to the hospital by car. The discharge information has been reviewed with the patient. The patient verbalized understanding. Discharge medications reviewed with the patient and appropriate educational materials and side effects teaching were provided. ___________________________________________________________________________________________________________________________________ Patient armband removed and given to patient to take home.   Patient was informed of the privacy risks if armband lost or stolen Introducing [facility]! [facility] introduces WiiiWaaa patient portal. Now you can access parts of your medical record, email your doctor's office, and request medication refills online. 1. In your internet browser, go to [email or web address] 2. Click on the First Time User? Click Here link in the Sign In box. You will see the New Member Sign Up page. 3. Enter your WiiiWaaa Access Code exactly as it appears below. You will not need to use this code after youve completed the sign-up process. If you do not sign up before the expiration date, you must request a new code. · WiiiWaaa Access Code: [email or web address] Expires: 9/26/2018  2:54 PM 
 
4. Enter the last four digits of your Social Security Number (xxxx) and Date of Birth (mm/dd/yyyy) as indicated and click Submit. You will be taken to the next sign-up page. 5. Create a WiiiWaaa ID. This will be your WiiiWaaa login ID and cannot be changed, so think of one that is secure and easy to remember. 6. Create a WiiiWaaa password. You can change your password at any time. 7. Enter your Password Reset Question and Answer. This can be used at a later time if you forget your password. 8. Enter your e-mail address. You will receive e-mail notification when new information is available in [address] 9. Click Sign Up. You can now view and download portions of your medical record. 10. Click the Download Summary menu link to download a portable copy of your medical information. If you have questions, please visit the Frequently Asked Questions section of the WiiiWaaa website. Remember, WiiiWaaa is NOT to be used for urgent needs. For medical emergencies, dial 911. Now available from your iPhone and Android! Introducing Dvais Greenwood As a [facility] patient, I wanted to make you aware of our electronic visit tool called Davis Greenwood. My Own Med allows you to connect within minutes with a medical provider 24 hours a day, seven days a week via a mobile device or tablet or logging into a secure website from your computer. You can access [employer] from anywhere in the [place]. A virtual visit might be right for you when you have a simple condition and feel like you just dont want to get out of bed, or cant get away from work for an appointment, when your regular [employer] provider is not available (evenings, weekends or holidays), or when youre out of town and need minor care. Electronic visits cost only $49 and if the [employer] provider determines a prescription is needed to treat your condition, one can be electronically transmitted to a nearby pharmacy*. Please take a moment to enroll today if you have not already done so. The enrollment process is free and takes just a few minutes. To enroll, please download the My Own Med anibal to your tablet or phone, or visit [email or web address] to enroll on your computer. And, as an [address] [place] [address] patient with a [employer] account, the results of your visits will be scanned into your electronic medical record and your primary care provider will be able to view the scanned results. We urge you to continue to see your regular [employer] provider for your ongoing medical care. And while your primary care provider may not be the one available when you seek a toucanBoxsunitafin virtual visit, the peace of mind you get from getting a real diagnosis real time can be priceless. For more information on toucanBoxsunitafin, view our Frequently Asked Questions (FAQs) at [email or web address]. Sincerely, 
 
Hilaria Moser MD 
Chief Medical Officer [employer] *:  certain medications cannot be prescribed via [employer] Unresulted Labs-Please follow up with your PCP about these lab tests Order Current Status EKG, 12 LEAD, INITIAL Preliminary result Providers Seen During Your Hospitalization Provider Specialty Primary office phone Rhiannon Rowland MD Gastroenterology [phone] Your Primary Care Physician (PCP) Primary Care Physician Office Phone Office Fax [address], [address] [phone] You are allergic to the following Allergen Reactions Cocoa Hives Also becomes dizzy Codeine Hives Pcn (Penicillins) Hives Recent Documentation Height Weight Breastfeeding? BMI OB Status Smoking Status 1.727 m 84.8 kg No 28.43 kg/m2 Menopause Never Smoker Emergency Contacts Name Discharge Info Relation Home Work Mobile LuiChristineBrandonAna María DISCHARGE CAREGIVER [3] Child [2] [phone] [address] CAREGIVER [3] Sister [23] [phone] Patient Belongings The following personal items are in your possession at time of discharge: 
  Dental Appliances: Partials, Uppers  Visual Aid: None Please provide this summary of care documentation to your next provider. Signatures-by signing, you are acknowledging that this After Visit Summary has been reviewed with you and you have received a copy. Patient Signature:  ____________________________________________________________ Date:  ____________________________________________________________  
  
[facility] Provider Signature:  ____________________________________________________________ Date:  ____________________________________________________________

## 2018-07-30 NOTE — ANESTHESIA POSTPROCEDURE EVALUATION
Post-Anesthesia Evaluation and Assessment Patient: Aracelsi Garnett MRN: [id number]  SSN: [id number] YOB: 1950  Age: 80 y.o. Sex: female Cardiovascular Function/Vital Signs Visit Vitals  /74 (BP 1 Location: Left arm, BP Patient Position: At rest)  Pulse (!) 110  Temp 36.7 °C (98 °F)  Resp 18  Ht 5' 8\" (1.727 m)  Wt 84.8 kg (187 lb)  SpO2 97%  Breastfeeding No  
 BMI 28.43 kg/m2 Patient is status post MAC anesthesia for Procedure(s): 
COLONOSCOPY. Nausea/Vomiting: None Postoperative hydration reviewed and adequate. Pain: 
Pain Scale 1: Numeric (0 - 10) (07/30/18 1152) Pain Intensity 1: 0 (07/30/18 1152) Managed Neurological Status: At baseline Mental Status and Level of Consciousness: Arousable Pulmonary Status:  
O2 Device: Room air (07/30/18 1128) Adequate oxygenation and airway patent Complications related to anesthesia: None Post-anesthesia assessment completed. No concerns Signed By: Argelia Carlin MD   
 July 30, 2018

## 2018-07-30 NOTE — DISCHARGE INSTRUCTIONS
Patient Discharge Instructions    Momo Crockett / [id number] : 1950    Admitted 2018 Discharged: 2018      ____      IMPRESSION:  1. 5mm sessile cecal polyp removed  2. Internal and external hemorrhoids  3. Otherwise normal colon    RECOMMENDATIONS:  1. Resume regular diet, Recommend high fiber. 2. Will contact with polyp results in 2 weeks. These results will determine timing to next screening. Patient will be instructed to contact our office if they have not received the results by three weeks. 3. Please contact our office if you have not received the results by three weeks. Recommended Diet: Regular Diet    Recommended Activity:    1. Do not drink alcohol, drive or operate machinery for 12 hours   2. Call if any fever, abdominal pain or bleeding noted. Signed By: Anika San MD     2018         DISCHARGE SUMMARY from Nurse    PATIENT INSTRUCTIONS:    After general anesthesia or intravenous sedation, for 24 hours or while taking prescription Narcotics:  · Limit your activities  · Do not drive and operate hazardous machinery  · Do not make important personal or business decisions  · Do  not drink alcoholic beverages  · If you have not urinated within 8 hours after discharge, please contact your surgeon on call.     Report the following to your surgeon:  · Excessive pain, swelling, redness or odor of or around the surgical area  · Temperature over 100.5  · Nausea and vomiting lasting longer than 4 hours or if unable to take medications  · Any signs of decreased circulation or nerve impairment to extremity: change in color, persistent  numbness, tingling, coldness or increase pain  · Any questions    What to do at Home:  Recommended activity: Activity as tolerated and no driving for today,     These are general instructions for a healthy lifestyle:    No smoking/ No tobacco products/ Avoid exposure to second hand smoke  Surgeon General's Warning:  Quitting smoking now greatly reduces serious risk to your health. Obesity, smoking, and sedentary lifestyle greatly increases your risk for illness    A healthy diet, regular physical exercise & weight monitoring are important for maintaining a healthy lifestyle    You may be retaining fluid if you have a history of heart failure or if you experience any of the following symptoms:  Weight gain of 3 pounds or more overnight or 5 pounds in a week, increased swelling in our hands or feet or shortness of breath while lying flat in bed. Please call your doctor as soon as you notice any of these symptoms; do not wait until your next office visit. Recognize signs and symptoms of STROKE:    F-face looks uneven    A-arms unable to move or move unevenly    S-speech slurred or non-existent    T-time-call 911 as soon as signs and symptoms begin-DO NOT go       Back to bed or wait to see if you get better-TIME IS BRAIN. Warning Signs of HEART ATTACK     Call 911 if you have these symptoms:   Chest discomfort. Most heart attacks involve discomfort in the center of the chest that lasts more than a few minutes, or that goes away and comes back. It can feel like uncomfortable pressure, squeezing, fullness, or pain.  Discomfort in other areas of the upper body. Symptoms can include pain or discomfort in one or both arms, the back, neck, jaw, or stomach.  Shortness of breath with or without chest discomfort.  Other signs may include breaking out in a cold sweat, nausea, or lightheadedness. Don't wait more than five minutes to call 911 - MINUTES MATTER! Fast action can save your life. Calling 911 is almost always the fastest way to get lifesaving treatment. Emergency Medical Services staff can begin treatment when they arrive -- up to an hour sooner than if someone gets to the hospital by car. The discharge information has been reviewed with the patient. The patient verbalized understanding.   Discharge medications reviewed with the patient and appropriate educational materials and side effects teaching were provided. ___________________________________________________________________________________________________________________________________  Patient armband removed and given to patient to take home.   Patient was informed of the privacy risks if armband lost or stolen

## 2018-07-30 NOTE — ANESTHESIA PREPROCEDURE EVALUATION
Anesthetic History No history of anesthetic complications Review of Systems / Medical History Patient summary reviewed and pertinent labs reviewed Pulmonary Within defined limits Neuro/Psych Within defined limits Cardiovascular Hypertension Exercise tolerance: >4 METS 
  
GI/Hepatic/Renal 
Within defined limits Endo/Other Hypothyroidism Other Findings Comments: Documentation of current medication Current medications obtained, documented and obtained? YES Risk Factors for Postoperative nausea/vomiting: 
     History of postoperative nausea/vomiting? NO Female? YES Motion sickness? NO Intended opioid administration for postoperative analgesia? NO Smoking Abstinence: 
Current Smoker? NO Elective Surgery? YES Seen preoperatively by anesthesiologist or proxy prior to day of surgery? YES Pt abstained from smoking 24 hours prior to anesthesia? N/A Preventive care/screening for High Blood Pressure: 
Aged 18 years and older: YES Screened for high blood pressure: YES Patients with high blood pressure referred to primary care provider 
 for BP management: YES Physical Exam 
 
Airway Mallampati: II 
TM Distance: 4 - 6 cm Neck ROM: normal range of motion Mouth opening: Normal 
 
 Cardiovascular Regular rate and rhythm,  S1 and S2 normal,  no murmur, click, rub, or gallop Rhythm: regular Rate: normal 
 
 
 
 Dental 
 
Dentition: Upper braces and Lower dentition intact Pulmonary Breath sounds clear to auscultation Abdominal 
GI exam deferred Other Findings Anesthetic Plan ASA: 2 Anesthesia type: MAC Induction: Intravenous Anesthetic plan and risks discussed with: Patient

## 2018-07-30 NOTE — H&P
History and Physical    Manny Olmedo  1950  [id number]  [id number]    Pre-Procedure Diagnosis:  hematochezia  k92.1      Evaluation of past illnesses, surgeries, or injuries:   YES  Past Medical History:   Diagnosis Date    Carpal tunnel syndrome 2005    left wrist    Degenerative arthritis     Goiter     Hypertension 2005    Hypothyroid 2011    post-surgical.     Migraines      Past Surgical History:   Procedure Laterality Date    HX ORTHOPAEDIC Left     fractured great toe requiring fusion.  HX THYROIDECTOMY  2011       Allergies: Allergies   Allergen Reactions    Cocoa Hives     Also becomes dizzy    Codeine Hives    Pcn [Penicillins] Hives       Previous reactions to sedation/analgesia? NO    Review of current medications, supplement, herbals and nutraceuticals complete:  YES  Current Facility-Administered Medications   Medication Dose Route Frequency Provider Last Rate Last Dose    lidocaine (PF) (XYLOCAINE) 10 mg/mL (1 %) injection 0.1 mL  0.1 mL SubCUTAneous PRN Liza Fears, CRNA        lactated Ringers infusion  50 mL/hr IntraVENous CONTINUOUS Liza Fears, CRNA        sodium chloride (NS) flush 5-10 mL  5-10 mL IntraVENous Q8H Liza Fears, CRNA        sodium chloride (NS) flush 5-10 mL  5-10 mL IntraVENous PRN Liza Fears, CRNA              Pertinent labs reviewed? YES    History of substance abuse?   NO  Family History   Problem Relation Age of Onset   Brad Hung Other Mother 68     leukemia    Alzheimer Father 80    Cancer Father      prostate    Cancer Brother      prostate    Hypertension Brother     Hypertension Sister     Hypertension Sister     Hypertension Sister     Diabetes Sister     Diabetes Sister     Alzheimer Sister      Social History     Social History    Marital status: SINGLE     Spouse name: N/A    Number of children: N/A    Years of education: N/A     Occupational History    teacher      Social History Main Topics    Smoking status: Never Smoker  Smokeless tobacco: Never Used    Alcohol use Yes      Comment: social    Drug use: No    Sexual activity: Not on file     Other Topics Concern    Not on file     Social History Narrative       Cardiac Status:  WNL    Mental Status:  WNL     Pulmonary Status:  WNL    NPO:  >4    Assessment/Impression: hematochezia    Plan of treatment: Colonoscopy        Norris Rodriguez MD  7/30/2018  10:54 AM

## 2018-09-06 RX ORDER — HYDROCHLOROTHIAZIDE 12.5 MG/1
CAPSULE ORAL
Qty: 90 CAP | Refills: 2 | Status: SHIPPED | OUTPATIENT
Start: 2018-09-06 | End: 2019-06-19 | Stop reason: SDUPTHER

## 2018-09-06 RX ORDER — HYDROCHLOROTHIAZIDE 12.5 MG/1
12.5 CAPSULE ORAL DAILY
Qty: 30 CAP | Refills: 2 | Status: SHIPPED | OUTPATIENT
Start: 2018-09-06 | End: 2018-09-06 | Stop reason: SDUPTHER

## 2018-09-06 NOTE — TELEPHONE ENCOUNTER
Requested Prescriptions     Pending Prescriptions Disp Refills    hydroCHLOROthiazide (MICROZIDE) 12.5 mg capsule       Sig: Take 1 Cap by mouth daily.

## 2019-03-27 ENCOUNTER — OFFICE VISIT (OUTPATIENT)
Dept: INTERNAL MEDICINE CLINIC | Age: 69
End: 2019-03-27

## 2019-03-27 VITALS
RESPIRATION RATE: 18 BRPM | SYSTOLIC BLOOD PRESSURE: 151 MMHG | HEIGHT: 68 IN | WEIGHT: 183.6 LBS | DIASTOLIC BLOOD PRESSURE: 78 MMHG | HEART RATE: 56 BPM | TEMPERATURE: 97.6 F | BODY MASS INDEX: 27.83 KG/M2

## 2019-03-27 DIAGNOSIS — M89.8X1 PAIN OF LEFT CLAVICLE: ICD-10-CM

## 2019-03-27 DIAGNOSIS — G56.03 BILATERAL CARPAL TUNNEL SYNDROME: Primary | ICD-10-CM

## 2019-03-27 DIAGNOSIS — M89.8X1 PAIN OF RIGHT CLAVICLE: ICD-10-CM

## 2019-03-27 DIAGNOSIS — M25.511 RIGHT SHOULDER PAIN, UNSPECIFIED CHRONICITY: ICD-10-CM

## 2019-03-27 DIAGNOSIS — E03.9 HYPOTHYROIDISM, UNSPECIFIED TYPE: ICD-10-CM

## 2019-03-27 RX ORDER — LEVOTHYROXINE SODIUM 125 UG/1
125 TABLET ORAL
Qty: 30 TAB | Refills: 3 | Status: SHIPPED | OUTPATIENT
Start: 2019-03-27 | End: 2019-03-28 | Stop reason: SDUPTHER

## 2019-03-27 RX ORDER — IBUPROFEN 800 MG/1
800 TABLET ORAL
Qty: 30 TAB | Refills: 0 | Status: SHIPPED | OUTPATIENT
Start: 2019-03-27 | End: 2020-08-14

## 2019-03-27 NOTE — PROGRESS NOTES
ROOM # 1    Lurdes Hook presents today for   Chief Complaint   Patient presents with    Follow-up     urgent care       Lurdes Hook preferred language for health care discussion is english/other. Is someone accompanying this pt? no    Is the patient using any DME equipment during [address]? no    Depression Screening:  3 most recent PHQ Screens 6/28/2018 3/13/2018   Little interest or pleasure in doing things Several days Not at all   Feeling down, depressed, irritable, or hopeless Several days Several days   Total Score PHQ 2 2 1       Learning Assessment:  Learning Assessment 3/13/2018   PRIMARY LEARNER Patient   HIGHEST LEVEL OF EDUCATION - PRIMARY LEARNER  4 YEARS OF COLLEGE   BARRIERS PRIMARY LEARNER NONE   CO-LEARNER CAREGIVER No   PRIMARY LANGUAGE ENGLISH    NEED No   LEARNER PREFERENCE PRIMARY OTHER (COMMENT)   LEARNING SPECIAL TOPICS no   ANSWERED BY self   RELATIONSHIP SELF   ASSESSMENT COMMENT no       Abuse Screening:  Abuse Screening Questionnaire 6/28/2018 3/13/2018   Do you ever feel afraid of your partner? N N   Are you in a relationship with someone who physically or mentally threatens you? N N   Is it safe for you to go home? Y Y       Fall Risk  Fall Risk Assessment, last 12 mths 6/28/2018 3/13/2018   Able to walk? Yes Yes   Fall in past 12 months? No No       Health Maintenance reviewed and discussed per provider. Yes    Lurdes Hook is due for   Health Maintenance Due   Topic Date Due    Hepatitis C Screening  1950    DTaP/Tdap/Td series (1 - Tdap) 06/03/1972    Shingrix Vaccine Age 50> (1 of 2) 06/03/2001    GLAUCOMA SCREENING Q2Y  06/03/2016    Pneumococcal 65+ years (1 of 2 - PCV13) 06/03/2016    MEDICARE YEARLY EXAM  03/14/2018    Influenza Age 9 to Adult  08/01/2018   HM to be d/w provider      Please order/place referral if appropriate. Advance Directive:  1. Do you have an advance directive in place? Patient Reply: no    2.  If not, would you like material regarding how to put one in place? Patient Reply: no    Coordination of Care:  1. Have you been to the ER, urgent care clinic since your last visit? Hospitalized since your last visit? Yes, [facility] weeks ago    2. Have you seen or consulted any other health care providers outside of the [address] [place] [address] since your last visit? Include any pap smears or colon screening.  no

## 2019-03-27 NOTE — PATIENT INSTRUCTIONS
1) Take ibuprofen  With food. If you notice any blood/black tarry stools, diarrhea, abdominal pain, nausea, vomiting then stop medication immediately. Give us a call ASAP. 2) Use heating pad, icy/hot, tiger balm for pain. 3) Follow-up in 3 months or sooner if worsening symptoms. Carpal Tunnel Syndrome: Care Instructions  Your Care Instructions    Carpal tunnel syndrome is a nerve problem. It can cause tingling, numbness, weakness, or pain in the fingers, thumb, and hand. The median nerve and several tough tissues called tendons run through a space in the wrist called the carpal tunnel. The repeated hand motions used in work and some hobbies and sports can put pressure on the nerve. Pregnancy and several conditions, including diabetes, arthritis, and an underactive thyroid, also can cause carpal tunnel syndrome. You may be able to limit an activity or do it differently to reduce your symptoms. You also can take other steps to feel better. If your symptoms are mild, 1 to 2 weeks of home treatment are likely to ease your pain. Surgery is needed only if other treatments do not work. Follow-up care is a key part of your treatment and safety. Be sure to make and go to all appointments, and call your doctor if you are having problems. It's also a good idea to know your test results and keep a list of the medicines you take. How can you care for yourself at home? · If possible, stop or reduce the activity that causes your symptoms. If you cannot stop the activity, take frequent breaks to rest and stretch or change hand positions to do a task. Try switching hands, such as when using a computer mouse. · Try to avoid bending or twisting your wrists. · Ask your doctor if you can take an over-the-counter pain medicine, such as acetaminophen (Tylenol), ibuprofen (Advil, Motrin), or naproxen (Aleve). Be safe with medicines. Read and follow all instructions on the label.   · If your doctor prescribes corticosteroid medicine to help reduce pain and swelling, take it exactly as prescribed. Call your doctor if you think you are having a problem with your medicine. · Put ice or a cold pack on your wrist for 10 to 20 minutes at a time to ease pain. Put a thin cloth between the ice and your skin. · If your doctor or your physical or occupational therapist tells you to wear a wrist splint, wear it as directed to keep your wrist in a neutral position. This also eases pressure on your median nerve. · Ask your doctor whether you should have physical or occupational therapy to learn how to do tasks differently. · Try a yoga class to stretch your muscles and build strength in your hands and wrists. Yoga has been shown to ease carpal tunnel symptoms. To prevent carpal tunnel  · When working at a computer, keep your hands and wrists in line with your forearms. Hold your elbows close to your sides. Take a break every 10 to 15 minutes. · Try these exercises:  ? Warm up: Rotate your wrist up, down, and from side to side. Repeat this 4 times. Stretch your fingers far apart, relax them, then stretch them again. Repeat 4 times. Stretch your thumb by pulling it back gently, holding it, and then releasing it. Repeat 4 times. ? Prayer stretch: Start with your palms together in front of your chest just below your chin. Slowly lower your hands toward your waistline while keeping your hands close to your stomach and your palms together until you feel a mild to moderate stretch under your forearms. Hold for 10 to 20 seconds. Repeat 4 times. ? Wrist flexor stretch: Hold your arm in front of you with your palm up. Bend your wrist, pointing your hand toward the floor. With your other hand, gently bend your wrist further until you feel a mild to moderate stretch in your forearm. Hold for 10 to 20 seconds. Repeat 4 times.   ? Wrist extensor stretch: Repeat the steps for the wrist flexor stretch, but begin with your extended hand palm down.  · Squeeze a rubber exercise ball several times a day to keep your hands and fingers strong. · Avoid holding objects (such as a book) in one position for a long time. When possible, use your whole hand to grasp an object. Using just the thumb and index finger can put stress on the wrist.  · Do not smoke. It can make this condition worse by reducing blood flow to the median nerve. If you need help quitting, talk to your doctor about stop-smoking programs and medicines. These can increase your chances of quitting for good. When should you call for help? Watch closely for changes in your health, and be sure to contact your doctor if:    · Your pain or other problems do not get better with home care.     · You want more information about physical or occupational therapy.     · You have side effects of your corticosteroid medicine, such as:  ? Weight gain. ? Mood changes. ? Trouble sleeping. ? Bruising easily.     · You have any other problems with your medicine. Where can you learn more? Go to http://carlyle-rommel.info/. Enter R432 in the search box to learn more about \"Carpal Tunnel Syndrome: Care Instructions. \"  Current as of: September 20, 2018  Content Version: 11.9  © 0960-5473 [employer]. Care instructions adapted under license by [employer] (which disclaims liability or warranty for this information). If you have questions about a medical condition or this instruction, always ask your healthcare professional. [place] [address] any warranty or liability for your use of this information. Carpal Tunnel Syndrome: Exercises  Your Care Instructions  Here are some examples of typical rehabilitation exercises for your condition. Start each exercise slowly. Ease off the exercise if you start to have pain.   Your doctor or your physical or occupational therapist will tell you when you can start these exercises and which ones will work best for you. Warm-up stretches  When you no longer have pain or numbness, you can do exercises to help prevent carpal tunnel syndrome from coming back. Do not do any stretch or movement that is uncomfortable or painful. 1. Rotate your wrist up, down, and from side to side. Repeat 4 times. 2. Stretch your fingers far apart. Relax them, and then stretch them again. Repeat 4 times. 3. Stretch your thumb by pulling it back gently, holding it, and then releasing it. Repeat 4 times. How to do the exercises  Prayer stretch    1. Start with your palms together in front of your chest just below your chin. 2. Slowly lower your hands toward your waistline, keeping your hands close to your stomach and your palms together until you feel a mild to moderate stretch under your forearms. 3. Hold for at least 15 to 30 seconds. Repeat 2 to 4 times. Wrist flexor stretch    1. Extend your arm in front of you with your palm up. 2. Bend your wrist, pointing your hand toward the floor. 3. With your other hand, gently bend your wrist farther until you feel a mild to moderate stretch in your forearm. 4. Hold for at least 15 to 30 seconds. Repeat 2 to 4 times. Wrist extensor stretch    1. Repeat steps 1 through 4 of the stretch above, but begin with your extended hand palm down. Follow-up care is a key part of your treatment and safety. Be sure to make and go to all appointments, and call your doctor if you are having problems. It's also a good idea to know your test results and keep a list of the medicines you take. Where can you learn more? Go to http://carlyle-rommel.info/. Enter N681 in the search box to learn more about \"Carpal Tunnel Syndrome: Exercises. \"  Current as of: September 20, 2018  Content Version: 11.9  © 4608-2851 [employer]. Care instructions adapted under license by [employer] (which disclaims liability or warranty for this information).  If you have questions about a medical condition or this instruction, always ask your healthcare professional. [address] any warranty or liability for your use of this information. Shoulder Arthritis: Exercises  Your Care Instructions  Here are some examples of typical rehabilitation exercises for your condition. Start each exercise slowly. Ease off the exercise if you start to have pain. Your doctor or physical therapist will tell you when you can start these exercises and which ones will work best for you. How to do the exercises  Shoulder flexion (lying down)    4. Lie on your back, holding a wand with both hands. Your palms should face down as you hold the wand. 5. Keeping your elbows straight, slowly raise your arms over your head. Raise them until you feel a stretch in your shoulders, upper back, and chest.  6. Hold for 15 to 30 seconds. 7. Repeat 2 to 4 times. Shoulder rotation (lying down)    4. Lie on your back. Hold a wand with both hands with your elbows bent and palms up. 5. Keep your elbows close to your body, and move the wand across your body toward the sore arm. 6. Hold for 8 to 12 seconds. 7. Repeat 2 to 4 times. Shoulder internal rotation with towel    5. Hold a towel above and behind your head with the arm that is not sore. 6. With your sore arm, reach behind your back and grasp the towel. 7. With the arm above your head, pull the towel upward. Do this until you feel a stretch on the front and outside of your sore shoulder. 8. Hold 15 to 30 seconds. 9. Repeat 2 to 4 times. Shoulder blade squeeze    2. Stand with your arms at your sides, and squeeze your shoulder blades together. Do not raise your shoulders up as you squeeze. 3. Hold 6 seconds. 4. Repeat 8 to 12 times. Resisted rows    1. Put the band around a solid object at about waist level. (A bedpost will work well.) Each hand should hold an end of the band.   2. With your elbows at your sides and bent to 90 degrees, pull the band back. Your shoulder blades should move toward each other. Return to the starting position. 3. Repeat 8 to 12 times. External rotator strengthening exercise    1. Start by tying a piece of elastic exercise material to a doorknob. You can use surgical tubing or Thera-Band. (You may also hold one end of the band in each hand.)  2. Stand or sit with your shoulder relaxed and your elbow bent 90 degrees. Your upper arm should rest comfortably against your side. Squeeze a rolled towel between your elbow and your body for comfort. This will help keep your arm at your side. 3. Hold one end of the elastic band with the hand of the painful arm. 4. Start with your forearm across your belly. Slowly rotate the forearm out away from your body. Keep your elbow and upper arm tucked against the towel roll or the side of your body until you begin to feel tightness in your shoulder. Slowly move your arm back to where you started. 5. Repeat 8 to 12 times. Internal rotator strengthening exercise    1. Start by tying a piece of elastic exercise material to a doorknob. You can use surgical tubing or Thera-Band. 2. Stand or sit with your shoulder relaxed and your elbow bent 90 degrees. Your upper arm should rest comfortably against your side. Squeeze a rolled towel between your elbow and your body for comfort. This will help keep your arm at your side. 3. Hold one end of the elastic band in the hand of the painful arm. 4. Slowly rotate your forearm toward your body until it touches your belly. Slowly move it back to where you started. 5. Keep your elbow and upper arm firmly tucked against the towel roll or at your side. 6. Repeat 8 to 12 times. Pendulum swing    1. Hold on to a table or the back of a chair with your good arm. Then bend forward a little and let your sore arm hang straight down. This exercise does not use the arm muscles.  Rather, use your legs and your hips to create movement that makes your arm swing freely. 2. Use the movement from your hips and legs to guide the slightly swinging arm back and forth like a pendulum (or elephant trunk). Then guide it in circles that start small (about the size of a dinner plate). Make the circles a bit larger each day, as your pain allows. 3. Do this exercise for 5 minutes, 5 to 7 times each day. 4. As you have less pain, try bending over a little farther to do this exercise. This will increase the amount of movement at your shoulder. Follow-up care is a key part of your treatment and safety. Be sure to make and go to all appointments, and call your doctor if you are having problems. It's also a good idea to know your test results and keep a list of the medicines you take. Where can you learn more? Go to http://carlyle-rommel.info/. Enter H562 in the search box to learn more about \"Shoulder Arthritis: Exercises. \"  Current as of: September 20, 2018  Content Version: 11.9  © 5096-3634 [employer]. Care instructions adapted under license by [employer] (which disclaims liability or warranty for this information). If you have questions about a medical condition or this instruction, always ask your healthcare professional. [address] any warranty or liability for your use of this information.

## 2019-03-27 NOTE — PROGRESS NOTES
Chief Complaint   Patient presents with    Follow-up     urgent care       HPI:     Savi Quiroz is a 80 y.o.  female with history of  Hypertension and hypothyroidism here for the above complaint. She went an urgent care on 2/18/19 and was told to use braces for her bilateral carpal tunnel. They gave her steroid injection. She said they didn't say anything about her sore throat. She had surgery on her thyroid 8 years ago and lately has been having sore throat. She said her voice has been raspy. She is having pains on side of throat. She is on 112 mcg synthroid. She feels hot and cold. She has lost weight. She does not sleep well. She denies any chest pain, shortness of breath, abdominal pain, dizziness. She has headaches for migraines. No dry skin and hair. She has fatigue. She has 2 knots in her clavicle have gotten bigger on both sides. She said tender to touch. This has been going on for 3-4 months. Carpal tunnel: for 1 year. Her hands are swollen. She uses braces. The pain is in both fingers. The carpal tunnel in right hand radiates up her right  clavicle. Pain scale: 9.5/10. Numbness and tingling in fingers. Constant pain. Sharp pain. OTC aleve. She also has right shoulder pain that has been going on for 1 year. Her TFT's on 3/13/19 showed;   Component      Latest Ref Rng & Units 3/13/2018           4:46 AM   TSH      0.450 - 4.500 uIU/mL 5.530 (H)   T4, Free      0.82 - 1.77 ng/dL 1.40       Past Medical History:   Diagnosis Date    Carpal tunnel syndrome 2005    left wrist    Colon polyp 07/30/2018    cecal, adenoma, 5 yr f/u    Degenerative arthritis     Goiter     Hypertension 2005    Hypothyroid 2011    post-surgical.     Migraines      Past Surgical History:   Procedure Laterality Date    COLONOSCOPY N/A 7/30/2018    COL[facility]bs [facility]COPY    HX COLONOSCOPY  07/30/2018    Dr. Janis La    HX ORTHOPAEDIC Left fractured great toe requiring fusion.  HX THYROIDECTOMY  2011     Current Outpatient Medications   Medication Sig    levothyroxine (SYNTHROID) 125 mcg tablet Take 1 Tab by mouth Daily (before breakfast).  ibuprofen (MOTRIN) 800 mg tablet Take 1 Tab by mouth every eight (8) hours as needed for Pain.  hydroCHLOROthiazide (MICROZIDE) 12.5 mg capsule TAKE 1 CAPSULE BY MOUTH DAILY    amLODIPine-benazepril (LOTREL) 5-20 mg per capsule Take 1 Cap by mouth daily. No current facility-administered medications for this visit. Health Maintenance   Topic Date Due    Hepatitis C Screening  1950    DTaP/Tdap/Td series (1 - Tdap) 06/03/1972    Shingrix Vaccine Age 50> (1 of 2) 06/03/2001    GLAUCOMA SCREENING Q2Y  06/03/2016    Pneumococcal 65+ years (1 of 2 - PCV13) 06/03/2016    MEDICARE YEARLY EXAM  03/14/2018    Influenza Age 9 to Adult  08/01/2018    BREAST CANCER SCRN MAMMOGRAM  04/02/2020    COLONOSCOPY  07/30/2023    Bone Densitometry (Dexa) Screening  Completed     Immunization History   Administered Date(s) Administered    Influenza High Dose Vaccine PF 11/08/2016, 01/19/2018     No LMP recorded. (Menstrual status: Menopause). Allergies and Intolerances: Allergies   Allergen Reactions    Cocoa Hives     Also becomes dizzy    Codeine Hives    Pcn [Penicillins] Hives       Family History:   Family History   Problem Relation Age of Onset    Other Mother 68        leukemia    Alzheimer Father 80    Cancer Father         prostate    Cancer Brother         prostate    Hypertension Brother     Hypertension Sister     Hypertension Sister     Hypertension Sister     Diabetes Sister     Diabetes Sister     Alzheimer Sister        Social History:   She  reports that she has never smoked. She has never used smokeless tobacco.  She  reports that she drinks alcohol.             ·     OBJECTIVE:   Physical exam:   Visit Vitals  /78 (BP 1 Location: Left arm, BP Patient Position: Sitting)   Pulse (!) 56   Temp 97.6 °F (36.4 °C) (Oral)   Resp 18   Ht 5' 8\" (1.727 m)   Wt 183 lb 9.6 oz (83.3 kg)   BMI 27.92 kg/m²        Generally: Pleasant female in no acute distress  Cardiac Exam: regular, rate, and rhythm. Normal S1 and S2. No murmurs, gallops, or rubs  Pulmonary exam: Clear to auscultation bilaterally  Abdominal exam: Positive bowel sounds in all four quadrants, soft, nondistended, nontender  Extremities: 2+ dorsalis pedis pulses bilaterally. No pedal edema    bilaterally  Right wrist more swollen than left. Tinel sign positive on both wrists. phalen's sign negative bilaterally. Right shoulder exam: TTP in the left shoulder and decrease ROM with abduction, movement above 90 degree angle and across chest. Crepitus with movement. Reflexes: 2/2 in lower extremities bilaterally  Musculoskeletal exam: 5/5 in upper extremities bilaterally  Bilateral clavicles: seemed swollen. No TTP     LABS/RADIOLOGICAL TESTS:  Lab Results   Component Value Date/Time    WBC 8.2 03/13/2018 04:46 AM    HGB 12.6 03/13/2018 04:46 AM    HCT 36.2 03/13/2018 04:46 AM    PLATELET 730 06/69/6099 04:46 AM     Lab Results   Component Value Date/Time    Sodium 139 03/13/2018 04:46 AM    Potassium 3.6 03/13/2018 04:46 AM    Chloride 96 03/13/2018 04:46 AM    CO2 29 03/13/2018 04:46 AM    Glucose 102 (H) 03/13/2018 04:46 AM    BUN 16 03/13/2018 04:46 AM    Creatinine 0.86 03/13/2018 04:46 AM     Lab Results   Component Value Date/Time    Cholesterol, total 264 (H) 03/13/2018 04:46 AM    HDL Cholesterol 70 03/13/2018 04:46 AM    LDL, calculated 166 (H) 03/13/2018 04:46 AM    Triglyceride 138 03/13/2018 04:46 AM     No results found for: GPT    Previous labs    ASSESSMENT/PLAN:    1. Bilateral carpal tunnel syndrome: continue to use brace  -     REFERRAL TO ORTHOPEDICS  -     ibuprofen (MOTRIN) 800 mg tablet; Take 1 Tab by mouth every eight (8) hours as needed for Pain.     2. Pain of left clavicle  -     XR CLAVICLE LT; Future    3. Pain of right clavicle  -     XR CLAVICLE RT; Future    4. Hypothyroidism, unspecified type: not well controlled. Will increase the levothyroxine to 125mcg daily. Will check thyroid levels in 1 month. -     levothyroxine (SYNTHROID) 125 mcg tablet; Take 1 Tab by mouth Daily (before breakfast). -     TSH AND FREE T4; Future    5. Right shoulder pain, unspecified chronicity: maybe arthritis. Given exercises. -     XR SHOULDER RT 1V; Future  -     ibuprofen (MOTRIN) 800 mg tablet; Take 1 Tab by mouth every eight (8) hours as needed for Pain. Told her to take ibuprofen With food. If you notice any blood/black tarry stools, diarrhea, abdominal pain, nausea, vomiting then stop medication immediately. Give us a call ASAP. Use heating pad, icy/hot, tiger balm for pain. Given exercises for carpal tunnel. 6.   Requested Prescriptions     Signed Prescriptions Disp Refills    levothyroxine (SYNTHROID) 125 mcg tablet 30 Tab 3     Sig: Take 1 Tab by mouth Daily (before breakfast).  ibuprofen (MOTRIN) 800 mg tablet 30 Tab 0     Sig: Take 1 Tab by mouth every eight (8) hours as needed for Pain. 7., Patient verbalized understanding and agreement with the plan. 8. Patient was given an after-visit summary. 9.   Follow-up and Dispositions    Return in about 3 months (around 6/27/2019) for f/u HTN, f/u thyroid. or sooner if worsening symptoms.   ·              Librado Nolen M.D.

## 2019-04-02 DIAGNOSIS — M25.511 ACUTE PAIN OF RIGHT SHOULDER: Primary | ICD-10-CM

## 2019-06-19 RX ORDER — HYDROCHLOROTHIAZIDE 12.5 MG/1
CAPSULE ORAL
Qty: 90 CAP | Refills: 0 | Status: SHIPPED | OUTPATIENT
Start: 2019-06-19 | End: 2019-09-15 | Stop reason: SDUPTHER

## 2019-07-01 ENCOUNTER — OFFICE VISIT (OUTPATIENT)
Dept: INTERNAL MEDICINE CLINIC | Age: 69
End: 2019-07-01

## 2019-07-01 VITALS
RESPIRATION RATE: 18 BRPM | DIASTOLIC BLOOD PRESSURE: 70 MMHG | HEART RATE: 53 BPM | WEIGHT: 182 LBS | SYSTOLIC BLOOD PRESSURE: 100 MMHG | OXYGEN SATURATION: 97 % | BODY MASS INDEX: 27.58 KG/M2 | TEMPERATURE: 98.3 F | HEIGHT: 68 IN

## 2019-07-01 DIAGNOSIS — I10 BENIGN HYPERTENSION WITHOUT CHF: Primary | ICD-10-CM

## 2019-07-01 DIAGNOSIS — E03.9 HYPOTHYROIDISM, UNSPECIFIED TYPE: ICD-10-CM

## 2019-07-01 RX ORDER — LEVOTHYROXINE SODIUM 125 UG/1
125 TABLET ORAL
Qty: 90 TAB | Refills: 3 | Status: SHIPPED | OUTPATIENT
Start: 2019-07-01 | End: 2020-06-23 | Stop reason: SDUPTHER

## 2019-07-01 NOTE — PROGRESS NOTES
Chief Complaint   Patient presents with    Hypertension     3 month fu     Thyroid Problem     3 month fu        HPI:     Shruti Rivera is a 77 y.o.  female with history of  Hypertension and hypothyroidism here for the above complaint. She denies any chest pain, shortness of breath, abdominal pain, dizziness. She has some headache today due to air conditioner not working. She has not been drinking enough water. Told her she needs to drink water. Past Medical History:   Diagnosis Date    Carpal tunnel syndrome 2005    left wrist    Colon polyp 07/30/2018    cecal, adenoma, 5 yr f/u    Degenerative arthritis     Goiter     Hypertension 2005    Hypothyroid 2011    post-surgical.     Migraines      Past Surgical History:   Procedure Laterality Date    COLONOSCOPY N/A 7/30/2018    COL[facility]a [facility] ENDOSCOPY    HX COLONOSCOPY  07/30/2018    Dr. Olsen Plan    HX ORTHOPAEDIC Left     fractured great toe requiring fusion.  HX THYROIDECTOMY  2011     Current Outpatient Medications   Medication Sig    levothyroxine (SYNTHROID) 125 mcg tablet Take 1 Tab by mouth Daily (before breakfast).  hydroCHLOROthiazide (MICROZIDE) 12.5 mg capsule TAKE 1 CAPSULE BY MOUTH DAILY    ibuprofen (MOTRIN) 800 mg tablet Take 1 Tab by mouth every eight (8) hours as needed for Pain.  amLODIPine-benazepril (LOTREL) 5-20 mg per capsule Take 1 Cap by mouth daily. No current facility-administered medications for this visit.       Health Maintenance   Topic Date Due    Hepatitis C Screening  1950    DTaP/Tdap/Td series (1 - Tdap) 06/03/1972    Shingrix Vaccine Age 50> (1 of 2) 06/03/2001    GLAUCOMA SCREENING Q2Y  06/03/2016    Pneumococcal 65+ years (1 of 2 - PCV13) 06/03/2016    MEDICARE YEARLY EXAM  03/14/2018    Influenza Age 9 to Adult  08/01/2019    BREAST CANCER SCRN MAMMOGRAM  04/02/2020    COLONOSCOPY  07/30/2023    Bone Densitometry (Dexa) Screening  Completed     Immunization History   Administered Date(s) Administered    Influenza High Dose Vaccine PF 11/08/2016, 01/19/2018     No LMP recorded. (Menstrual status: Menopause). Allergies and Intolerances: Allergies   Allergen Reactions    Cocoa Hives     Also becomes dizzy    Codeine Hives    Pcn [Penicillins] Hives       Family History:   Family History   Problem Relation Age of Onset    Other Mother 68        leukemia    Alzheimer Father 80    Cancer Father         prostate    Cancer Brother         prostate    Hypertension Brother     Hypertension Sister     Hypertension Sister     Hypertension Sister     Diabetes Sister     Diabetes Sister     Alzheimer Sister        Social History:   She  reports that she has never smoked. She has never used smokeless tobacco.  She  reports that she drinks alcohol. ·     OBJECTIVE:   Physical exam:   Visit Vitals  /70 (BP 1 Location: Left arm, BP Patient Position: Sitting)   Pulse (!) 53   Temp 98.3 °F (36.8 °C) (Oral)   Resp 18   Ht 5' 8\" (1.727 m)   Wt 182 lb (82.6 kg)   SpO2 97%   BMI 27.67 kg/m²        Generally: Pleasant female in no acute distress  Cardiac Exam: regular, rate, and rhythm. Normal S1 and S2. No murmurs, gallops, or rubs  Pulmonary exam: Clear to auscultation bilaterally  Abdominal exam: Positive bowel sounds in all four quadrants, soft, nondistended, nontender  Extremities: 2+ dorsalis pedis pulses bilaterally.  No pedal edema    bilaterally    LABS/RADIOLOGICAL TESTS:  Lab Results   Component Value Date/Time    WBC 8.2 03/13/2018 04:46 AM    HGB 12.6 03/13/2018 04:46 AM    HCT 36.2 03/13/2018 04:46 AM    PLATELET 167 27/64/2422 04:46 AM     Lab Results   Component Value Date/Time    Sodium 139 03/13/2018 04:46 AM    Potassium 3.6 03/13/2018 04:46 AM    Chloride 96 03/13/2018 04:46 AM    CO2 29 03/13/2018 04:46 AM    Glucose 102 (H) 03/13/2018 04:46 AM    BUN 16 03/13/2018 04:46 AM    Creatinine 0.86 03/13/2018 04:46 AM     Lab Results   Component Value Date/Time    Cholesterol, total 264 (H) 03/13/2018 04:46 AM    HDL Cholesterol 70 03/13/2018 04:46 AM    LDL, calculated 166 (H) 03/13/2018 04:46 AM    Triglyceride 138 03/13/2018 04:46 AM     No results found for: GPT  Previous labs    ASSESSMENT/PLAN:    1. Benign hypertension without CHF: stable. Keep hydrated with water. Monitor blood pressure and let us know if too high or too low. Continue diet and exercise and HCTZ and lotrel. Keep hydrated with water. -     METABOLIC PANEL, BASIC; Future    2. Hypothyroidism, unspecified type: stable. Continue synthroid. -     levothyroxine (SYNTHROID) 125 mcg tablet; Take 1 Tab by mouth Daily (before breakfast). -     TSH AND FREE T4; Future      3. Requested Prescriptions     Signed Prescriptions Disp Refills    levothyroxine (SYNTHROID) 125 mcg tablet 90 Tab 3     Sig: Take 1 Tab by mouth Daily (before breakfast). 4. Patient verbalized understanding and agreement with the plan. 5. Patient was given an after-visit summary. 6.   Follow-up and Dispositions    Return in about 3 months (around 10/1/2019) for f/u thyroid, f/u HTN  or sooner if worsening symptoms.   ·              Sailaja Smith M.D.

## 2019-07-01 NOTE — PROGRESS NOTES
ROOM # 1  Identified pt with two pt identifiers(name and ). Reviewed record in preparation for visit and have obtained necessary documentation. Chief Complaint   Patient presents with    Hypertension     3 month fu     Thyroid Problem     3 month fu       France Sacks preferred language for health care discussion is english/other. Is the patient using any DME equipment during OV? Priyank Garcia is due for:  Health Maintenance Due   Topic    Hepatitis C Screening     DTaP/Tdap/Td series (1 - Tdap)    Shingrix Vaccine Age 50> (1 of 2)    GLAUCOMA SCREENING Q2Y     Pneumococcal 65+ years (1 of 2 - PCV13)    [address] Maintenance reviewed and discussed per provider  Please order/place referral if appropriate. Advance Directive:  1. Do you have an advance directive in place? Patient Reply: NO    2. If not, would you like material regarding how to put one in place? NO    Coordination of Care:  1. Have you been to the ER, urgent care clinic since your last visit? Hospitalized since your last visit? NO    2. Have you seen or consulted any other health care providers outside of the [address] [place] [address] since your last visit? Include any pap smears or colon screening. NO    Patient is accompanied by self I have received verbal consent from France Sacks to discuss any/all medical information while they are present in the room.     Learning Assessment:  Learning Assessment 3/13/2018   PRIMARY LEARNER Patient   HIGHEST LEVEL OF EDUCATION - PRIMARY LEARNER  4 YEARS OF COLLEGE   BARRIERS PRIMARY LEARNER NONE   CO-LEARNER CAREGIVER No   PRIMARY LANGUAGE ENGLISH    NEED No   LEARNER PREFERENCE PRIMARY OTHER (COMMENT)   LEARNING SPECIAL TOPICS no   ANSWERED BY self   RELATIONSHIP SELF   ASSESSMENT COMMENT no     Depression Screening:  3 most recent PHQ Screens 2018 3/13/2018   Little interest or pleasure in doing things Several days Not at all   Feeling down, depressed, irritable, or hopeless Several days Several days   Total Score PHQ 2 2 1     Abuse Screening:  Abuse Screening Questionnaire 6/28/2018 3/13/2018   Do you ever feel afraid of your partner? N N   Are you in a relationship with someone who physically or mentally threatens you? N N   Is it safe for you to go home? Y Y     Fall Risk  Fall Risk Assessment, last 12 mths 6/28/2018 3/13/2018   Able to walk? Yes Yes   Fall in past 12 months?  No No

## 2019-07-03 LAB
BUN SERPL-MCNC: 12 MG/DL (ref 8–27)
BUN/CREAT SERPL: 10 (ref 12–28)
CALCIUM SERPL-MCNC: 9.5 MG/DL (ref 8.7–10.3)
CHLORIDE SERPL-SCNC: 99 MMOL/L (ref 96–106)
CO2 SERPL-SCNC: 23 MMOL/L (ref 20–29)
CREAT SERPL-MCNC: 1.15 MG/DL (ref 0.57–1)
GLUCOSE SERPL-MCNC: 124 MG/DL (ref 65–99)
POTASSIUM SERPL-SCNC: 3.6 MMOL/L (ref 3.5–5.2)
SODIUM SERPL-SCNC: 140 MMOL/L (ref 134–144)
T4 FREE SERPL-MCNC: 1.37 NG/DL (ref 0.82–1.77)
TSH SERPL DL<=0.005 MIU/L-ACNC: 6.18 UIU/ML (ref 0.45–4.5)

## 2019-07-07 NOTE — PROGRESS NOTES
Please let pt know that labs were normal except:    1) TSH up, but FT4 normal. Has she missed any dosages of the thyroid medication. 2) Any weight gain/loss, cold/hot intolerance, dry/oily skin/hair, constipation/diarrhea? 3) creatinine little up. Keep hydrated with water.

## 2019-07-08 NOTE — PROGRESS NOTES
Attempted to contact pt at  number, no answer. Lvm for pt to return call to office at [phone]. Will continue to try to contact pt.

## 2019-07-11 NOTE — PROGRESS NOTES
Attempted to contact patient at  number, no answer. Lvm for patient to return call to office at [phone]. I have been unable to reach this patient by phone. A letter is being sent to the last known home address.

## 2019-08-06 ENCOUNTER — OFFICE VISIT (OUTPATIENT)
Dept: INTERNAL MEDICINE CLINIC | Age: 69
End: 2019-08-06

## 2019-08-06 ENCOUNTER — DOCUMENTATION ONLY (OUTPATIENT)
Dept: INTERNAL MEDICINE CLINIC | Age: 69
End: 2019-08-06

## 2019-08-06 VITALS
SYSTOLIC BLOOD PRESSURE: 117 MMHG | HEIGHT: 68 IN | OXYGEN SATURATION: 98 % | BODY MASS INDEX: 27.13 KG/M2 | WEIGHT: 179 LBS | HEART RATE: 58 BPM | DIASTOLIC BLOOD PRESSURE: 53 MMHG | TEMPERATURE: 96.9 F | RESPIRATION RATE: 22 BRPM

## 2019-08-06 DIAGNOSIS — M79.641 PAIN IN BOTH HANDS: ICD-10-CM

## 2019-08-06 DIAGNOSIS — M25.541 ARTHRALGIA OF BOTH HANDS: Primary | ICD-10-CM

## 2019-08-06 DIAGNOSIS — M25.542 ARTHRALGIA OF BOTH HANDS: Primary | ICD-10-CM

## 2019-08-06 DIAGNOSIS — R73.9 ELEVATED BLOOD SUGAR: ICD-10-CM

## 2019-08-06 DIAGNOSIS — M79.642 PAIN IN BOTH HANDS: ICD-10-CM

## 2019-08-06 DIAGNOSIS — R20.2 NUMBNESS AND TINGLING: ICD-10-CM

## 2019-08-06 DIAGNOSIS — R20.0 NUMBNESS AND TINGLING: ICD-10-CM

## 2019-08-06 NOTE — PROGRESS NOTES
Pharmacist Note: Immunization Update    Patient: Rafael Mercado (86 y.o., 1950)     Patient's immunization history was updated to reflect information contained in the [place] and/or outside immunization/pharmacy records were reconciled within [facility]. Health Maintenance schedule updated when appropriate.     Current immunizations now reflect:       Immunization History   Administered Date(s) Administered    Influenza High Dose Vaccine PF 11/08/2016, 01/19/2018    Influenza Vaccine (Tri) Adjuvanted 10/27/2018       Michelle Judd, PharmD, BCACP

## 2019-08-06 NOTE — PROGRESS NOTES
ROOM # 4    Oleg Thomson presents today for   Chief Complaint   Patient presents with    Hand Pain    Foot Pain    Tingling     in both hands        Oleg Thomson preferred language for health care discussion is english/other. Is someone accompanying this pt? no    Is the patient using any DME equipment during [address]? no    Depression Screening:  3 most recent PHQ Screens 6/28/2018 3/13/2018   Little interest or pleasure in doing things Several days Not at all   Feeling down, depressed, irritable, or hopeless Several days Several days   Total Score PHQ 2 2 1       Learning Assessment:  Learning Assessment 3/13/2018   PRIMARY LEARNER Patient   HIGHEST LEVEL OF EDUCATION - PRIMARY LEARNER  4 YEARS OF COLLEGE   BARRIERS PRIMARY LEARNER NONE   CO-LEARNER CAREGIVER No   PRIMARY LANGUAGE ENGLISH    NEED No   LEARNER PREFERENCE PRIMARY OTHER (COMMENT)   LEARNING SPECIAL TOPICS no   ANSWERED BY self   RELATIONSHIP SELF   ASSESSMENT COMMENT no       Abuse Screening:  Abuse Screening Questionnaire 6/28/2018 3/13/2018   Do you ever feel afraid of your partner? N N   Are you in a relationship with someone who physically or mentally threatens you? N N   Is it safe for you to go home? Y Y       Fall Risk  Fall Risk Assessment, last 12 mths 6/28/2018 3/13/2018   Able to walk? Yes Yes   Fall in past 12 months? No No           Health Maintenance reviewed and discussed per provider. Yes    Oleg Thomson is due for   Health Maintenance Due   Topic Date Due    Hepatitis C Screening  1950    DTaP/Tdap/Td series (1 - Tdap) 06/03/1972    Shingrix Vaccine Age 50> (1 of 2) 06/03/2001    GLAUCOMA SCREENING Q2Y  06/03/2016    Pneumococcal 65+ years (1 of 2 - PCV13) 06/03/2016    MEDICARE YEARLY EXAM  03/14/2018    Influenza Age 9 to Adult  08/01/2019     Please order/place referral if appropriate. Advance Directive:  1. Do you have an advance directive in place? Patient Reply: no    2.  If not, would you like material regarding how to put one in place? Patient Reply: no    Coordination of Care:  1. Have you been to the ER, urgent care clinic since your last visit? Hospitalized since your last visit? yes    2. Have you seen or consulted any other health care providers outside of the [address] [place] [address] since your last visit? Include any pap smears or colon screening.  no

## 2019-08-06 NOTE — PROGRESS NOTES
HISTORY OF PRESENT ILLNESS  Josiah Connor is a 77 y.o. female. Visit Vitals  /53 (BP 1 Location: Left arm, BP Patient Position: Sitting)   Pulse (!) 58   Temp 96.9 °F (36.1 °C) (Oral)   Resp 22   Ht 5' 8\" (1.727 m)   Wt 179 lb (81.2 kg)   SpO2 98%   BMI 27.22 kg/m²       C/o pain in her fingers and some enlargement of joints exp right hand that has been present a while but slowly getting worse. Numbness   The history is provided by the patient. This is a new problem. The current episode started more than 1 week ago (both hands and feet have been tingling at least a year). Review of Systems   Constitutional: Negative. Gets hot flashes and still has sxs of menopause (she should be well past menopause, but her \"thermostat\" may no longer work as well   Neurological: Positive for numbness. Physical Exam   Constitutional: She is oriented to person, place, and time. She appears well-developed and well-nourished. No distress. Cardiovascular: Normal rate. Pulmonary/Chest: Effort normal.   Musculoskeletal: She exhibits no edema. Right 3rd finger PIP is a little swollen but not hot. NML flexion   Neurological: She is alert and oriented to person, place, and time. Skin: Skin is warm and dry. She is not diaphoretic. Psychiatric: She has a normal mood and affect. Nursing note and vitals reviewed. ASSESSMENT and PLAN    ICD-10-CM ICD-9-CM    1. Arthralgia of both hands M25.541 719.44 C REACTIVE PROTEIN, QT    M25.542  ANTINUCLEAR ANTIBODIES, IFA      CYCLIC CITRUL PEPTIDE AB, IGG   2. Pain in both hands M79.641 729.5 XR HAND LT AP/LAT    M79.642  XR HAND RT AP/LAT   3.  Numbness and tingling S87.9 329.1 METABOLIC PANEL, COMPREHENSIVE    R20.2  HEMOGLOBIN A1C W/O EAG      CBC WITH AUTOMATED DIFF      TSH AND FREE T4      VITAMIN B12 & FOLATE      REFERRAL TO NEUROLOGY   4. Elevated blood sugar R73.9 790.29 HEMOGLOBIN A1C W/O EAG     Osteoarthritis of the hands    Her sxs sound like peripheral neuropathy  Will screen for diabetes and other deficiencies that can cause neuropathy. Recently had her thyroid adjusted so will recheck that    Will ref to neurology for an evaluation for possible peripheral neuropathy.     F/u here one month for recheck

## 2019-08-09 LAB
ALBUMIN SERPL-MCNC: 4.3 G/DL (ref 3.6–4.8)
ALBUMIN/GLOB SERPL: 1.2 {RATIO} (ref 1.2–2.2)
ALP SERPL-CCNC: 82 IU/L (ref 39–117)
ALT SERPL-CCNC: 16 IU/L (ref 0–32)
ANA TITR SER IF: NEGATIVE {TITER}
AST SERPL-CCNC: 17 IU/L (ref 0–40)
BASOPHILS # BLD AUTO: 0 X10E3/UL (ref 0–0.2)
BASOPHILS NFR BLD AUTO: 0 %
BILIRUB SERPL-MCNC: <0.2 MG/DL (ref 0–1.2)
BUN SERPL-MCNC: 12 MG/DL (ref 8–27)
BUN/CREAT SERPL: 15 (ref 12–28)
CALCIUM SERPL-MCNC: 10.2 MG/DL (ref 8.7–10.3)
CCP IGA+IGG SERPL IA-ACNC: 1 UNITS (ref 0–19)
CHLORIDE SERPL-SCNC: 102 MMOL/L (ref 96–106)
CO2 SERPL-SCNC: 26 MMOL/L (ref 20–29)
CREAT SERPL-MCNC: 0.81 MG/DL (ref 0.57–1)
CRP SERPL-MCNC: 4 MG/L (ref 0–10)
EOSINOPHIL # BLD AUTO: 0.1 X10E3/UL (ref 0–0.4)
EOSINOPHIL NFR BLD AUTO: 1 %
ERYTHROCYTE [DISTWIDTH] IN BLOOD BY AUTOMATED COUNT: 13.9 % (ref 12.3–15.4)
FOLATE SERPL-MCNC: 5.7 NG/ML
GLOBULIN SER CALC-MCNC: 3.5 G/DL (ref 1.5–4.5)
GLUCOSE SERPL-MCNC: 85 MG/DL (ref 65–99)
HBA1C MFR BLD: 5.9 % (ref 4.8–5.6)
HCT VFR BLD AUTO: 34.6 % (ref 34–46.6)
HGB BLD-MCNC: 11.6 G/DL (ref 11.1–15.9)
IMM GRANULOCYTES # BLD AUTO: 0 X10E3/UL (ref 0–0.1)
IMM GRANULOCYTES NFR BLD AUTO: 0 %
LYMPHOCYTES # BLD AUTO: 2.2 X10E3/UL (ref 0.7–3.1)
LYMPHOCYTES NFR BLD AUTO: 33 %
MCH RBC QN AUTO: 27 PG (ref 26.6–33)
MCHC RBC AUTO-ENTMCNC: 33.5 G/DL (ref 31.5–35.7)
MCV RBC AUTO: 81 FL (ref 79–97)
MONOCYTES # BLD AUTO: 0.5 X10E3/UL (ref 0.1–0.9)
MONOCYTES NFR BLD AUTO: 8 %
NEUTROPHILS # BLD AUTO: 3.9 X10E3/UL (ref 1.4–7)
NEUTROPHILS NFR BLD AUTO: 58 %
PLATELET # BLD AUTO: 285 X10E3/UL (ref 150–450)
POTASSIUM SERPL-SCNC: 3.4 MMOL/L (ref 3.5–5.2)
PROT SERPL-MCNC: 7.8 G/DL (ref 6–8.5)
RBC # BLD AUTO: 4.29 X10E6/UL (ref 3.77–5.28)
SODIUM SERPL-SCNC: 139 MMOL/L (ref 134–144)
T4 FREE SERPL-MCNC: 1.84 NG/DL (ref 0.82–1.77)
TSH SERPL DL<=0.005 MIU/L-ACNC: 1.47 UIU/ML (ref 0.45–4.5)
VIT B12 SERPL-MCNC: 329 PG/ML (ref 232–1245)
WBC # BLD AUTO: 6.7 X10E3/UL (ref 3.4–10.8)

## 2019-11-26 ENCOUNTER — OFFICE VISIT (OUTPATIENT)
Dept: NEUROLOGY | Age: 69
End: 2019-11-26

## 2019-11-26 VITALS
WEIGHT: 182 LBS | HEART RATE: 60 BPM | TEMPERATURE: 98.5 F | SYSTOLIC BLOOD PRESSURE: 116 MMHG | DIASTOLIC BLOOD PRESSURE: 70 MMHG | HEIGHT: 68 IN | RESPIRATION RATE: 18 BRPM | BODY MASS INDEX: 27.58 KG/M2 | OXYGEN SATURATION: 97 %

## 2019-11-26 DIAGNOSIS — G56.03 BILATERAL CARPAL TUNNEL SYNDROME: Primary | ICD-10-CM

## 2019-11-26 DIAGNOSIS — M62.830 BACK MUSCLE SPASM: ICD-10-CM

## 2019-11-26 RX ORDER — CYCLOBENZAPRINE HCL 10 MG
10 TABLET ORAL
Qty: 90 TAB | Refills: 2 | Status: SHIPPED | OUTPATIENT
Start: 2019-11-26 | End: 2020-08-14

## 2019-11-26 NOTE — LETTER
11/26/19 Patient: Ashley Villegas YOB: 1950 Date of Visit: 11/26/2019 Wilma Sen MD 
[address] [facility] [phone] VIA In Basket Dear Wilma Sen MD, Thank you for referring Ms. Ashley Villegas to [address] [place] [address] for evaluation. My notes for this consultation are attached. If you have questions, please do not hesitate to call me. I look forward to following your patient along with you.  
 
 
Sincerely, 
 
Asia Britt MD

## 2019-11-26 NOTE — PROGRESS NOTES
Jareth Myrick is a 77 y.o. female new patient in today to discuss BUE/BLE numbness and tingling; as referred by Aleena Biggs MD.    Patient has additional concerns of swollen bones of neck and trouble sleeping. Learning assessment previously completed 3/13/2018; primary language is [place].
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
## 2020-02-10 ENCOUNTER — OFFICE VISIT (OUTPATIENT)
Dept: INTERNAL MEDICINE CLINIC | Age: 70
End: 2020-02-10

## 2020-02-10 ENCOUNTER — HOSPITAL ENCOUNTER (OUTPATIENT)
Dept: LAB | Age: 70
Discharge: HOME | End: 2020-02-10
Payer: MEDICARE

## 2020-02-10 ENCOUNTER — TELEPHONE (OUTPATIENT)
Dept: INTERNAL MEDICINE CLINIC | Age: 70
End: 2020-02-10

## 2020-02-10 VITALS
HEIGHT: 68 IN | DIASTOLIC BLOOD PRESSURE: 82 MMHG | HEART RATE: 78 BPM | SYSTOLIC BLOOD PRESSURE: 127 MMHG | WEIGHT: 184 LBS | BODY MASS INDEX: 27.89 KG/M2 | RESPIRATION RATE: 16 BRPM | OXYGEN SATURATION: 98 % | TEMPERATURE: 97.5 F

## 2020-02-10 DIAGNOSIS — Z11.59 NEED FOR HEPATITIS C SCREENING TEST: ICD-10-CM

## 2020-02-10 DIAGNOSIS — H91.90 HEARING LOSS, UNSPECIFIED HEARING LOSS TYPE, UNSPECIFIED LATERALITY: ICD-10-CM

## 2020-02-10 DIAGNOSIS — Z23 ENCOUNTER FOR IMMUNIZATION: ICD-10-CM

## 2020-02-10 DIAGNOSIS — E03.9 HYPOTHYROIDISM, UNSPECIFIED TYPE: ICD-10-CM

## 2020-02-10 DIAGNOSIS — Z11.1 SCREENING FOR TUBERCULOSIS: ICD-10-CM

## 2020-02-10 DIAGNOSIS — R05.9 COUGH: ICD-10-CM

## 2020-02-10 DIAGNOSIS — Z12.31 ENCOUNTER FOR SCREENING MAMMOGRAM FOR MALIGNANT NEOPLASM OF BREAST: ICD-10-CM

## 2020-02-10 DIAGNOSIS — I10 BENIGN HYPERTENSION WITHOUT CHF: ICD-10-CM

## 2020-02-10 DIAGNOSIS — Z00.00 MEDICARE ANNUAL WELLNESS VISIT, SUBSEQUENT: Primary | ICD-10-CM

## 2020-02-10 LAB
ALBUMIN SERPL-MCNC: 4.2 G/DL (ref 3.4–5)
ALBUMIN/GLOB SERPL: 0.9 {RATIO} (ref 0.8–1.7)
ALP SERPL-CCNC: 125 U/L (ref 45–117)
ALT SERPL-CCNC: 26 U/L (ref 13–56)
ANION GAP SERPL CALC-SCNC: 7 MMOL/L (ref 3–18)
AST SERPL-CCNC: 28 U/L (ref 10–38)
BILIRUB SERPL-MCNC: 0.4 MG/DL (ref 0.2–1)
BUN SERPL-MCNC: 13 MG/DL (ref 7–18)
BUN/CREAT SERPL: 15 (ref 12–20)
CALCIUM SERPL-MCNC: 9.5 MG/DL (ref 8.5–10.1)
CHLORIDE SERPL-SCNC: 100 MMOL/L (ref 100–111)
CHOLEST SERPL-MCNC: 231 MG/DL
CO2 SERPL-SCNC: 31 MMOL/L (ref 21–32)
CREAT SERPL-MCNC: 0.89 MG/DL (ref 0.6–1.3)
GLOBULIN SER CALC-MCNC: 4.9 G/DL (ref 2–4)
GLUCOSE SERPL-MCNC: 86 MG/DL (ref 74–99)
HDLC SERPL-MCNC: 66 MG/DL (ref 40–60)
HDLC SERPL: 3.5 {RATIO} (ref 0–5)
LDLC SERPL CALC-MCNC: 129.4 MG/DL (ref 0–100)
LIPID PROFILE,FLP: ABNORMAL
POTASSIUM SERPL-SCNC: 4.1 MMOL/L (ref 3.5–5.5)
PROT SERPL-MCNC: 9.1 G/DL (ref 6.4–8.2)
SODIUM SERPL-SCNC: 138 MMOL/L (ref 136–145)
T4 FREE SERPL-MCNC: 1.5 NG/DL (ref 0.7–1.5)
TRIGL SERPL-MCNC: 178 MG/DL (ref ?–150)
TSH SERPL DL<=0.05 MIU/L-ACNC: 0.77 UIU/ML (ref 0.36–3.74)
VLDLC SERPL CALC-MCNC: 35.6 MG/DL

## 2020-02-10 PROCEDURE — 84439 ASSAY OF FREE THYROXINE: CPT

## 2020-02-10 PROCEDURE — 86803 HEPATITIS C AB TEST: CPT

## 2020-02-10 PROCEDURE — 80061 LIPID PANEL: CPT

## 2020-02-10 PROCEDURE — 80053 COMPREHEN METABOLIC PANEL: CPT

## 2020-02-10 RX ORDER — BENZONATATE 200 MG/1
200 CAPSULE ORAL
Qty: 21 CAP | Refills: 0 | Status: SHIPPED | OUTPATIENT
Start: 2020-02-10 | End: 2020-02-17

## 2020-02-10 NOTE — PROGRESS NOTES
PPSV-23 immunization administered right deltoid  2/10/2020 by Enrique Vital LPN with pt's consent. Patient tolerated procedure well. No reactions noted.

## 2020-02-10 NOTE — PATIENT INSTRUCTIONS
Medicare Wellness Visit, Female The best way to live healthy is to have a lifestyle where you eat a well-balanced diet, exercise regularly, limit alcohol use, and quit all forms of tobacco/nicotine, if applicable. Regular preventive services are another way to keep healthy. Preventive services (vaccines, screening tests, monitoring & exams) can help personalize your care plan, which helps you manage your own care. Screening tests can find health problems at the earliest stages, when they are easiest to treat. [facility] follows the current, evidence-based guidelines published by the [facility] ([facility]) when recommending preventive services for our patients. Because we follow these guidelines, sometimes recommendations change over time as research supports it. (For example, mammograms used to be recommended annually. Even though Medicare will still pay for an annual mammogram, the newer guidelines recommend a mammogram every two years for women of average risk). Of course, you and your doctor may decide to screen more often for some diseases, based on your risk and your co-morbidities (chronic disease you are already diagnosed with). Preventive services for you include: - Medicare offers their members a free annual wellness visit, which is time for you and your primary care provider to discuss and plan for your preventive service needs. Take advantage of this benefit every year! 
-All adults over the age of 72 should receive the recommended pneumonia vaccines. Current USPSTF guidelines recommend a series of two vaccines for the best pneumonia protection.  
-All adults should have a flu vaccine yearly and a tetanus vaccine every 10 years.  
-All adults age 48 and older should receive the shingles vaccines (series of two vaccines). -All adults age 38-68 who are overweight should have a diabetes screening test once every three years. -All adults born between [birth year] and 1965 should be screened once for Hepatitis C. 
-Other screening tests and preventive services for persons with diabetes include: an eye exam to screen for diabetic retinopathy, a kidney function test, a foot exam, and stricter control over your cholesterol.  
-Cardiovascular screening for adults with routine risk involves an electrocardiogram (ECG) at intervals determined by your doctor.  
-Colorectal cancer screenings should be done for adults age 54-65 with no increased risk factors for colorectal cancer. There are a number of acceptable methods of screening for this type of cancer. Each test has its own benefits and drawbacks. Discuss with your doctor what is most appropriate for you during your annual wellness visit. The different tests include: colonoscopy (considered the best screening method), a fecal occult blood test, a fecal DNA test, and sigmoidoscopy. 
 
-A bone mass density test is recommended when a woman turns 65 to screen for osteoporosis. This test is only recommended one time, as a screening. Some providers will use this same test as a disease monitoring tool if you already have osteoporosis. -Breast cancer screenings are recommended every other year for women of normal risk, age 54-69. 
-Cervical cancer screenings for women over age 72 are only recommended with certain risk factors. Here is a list of your current Health Maintenance items (your personalized list of preventive services) with a due date: 
Health Maintenance Due Topic Date Due  
 Hepatitis C Test  1950  Glaucoma Screening   06/03/2016  Pneumococcal Vaccine (1 of 1 - PPSV23) 06/03/2016 Юлия Annual Well Visit  03/14/2018

## 2020-02-10 NOTE — LETTER
NOTIFICATION RETURN TO WORK / SCHOOL 
 
2/10/2020 1:04 PM 
 
Ms. Andres Danielle [address] [place] [id number] To Whom It May Concern: Andres Danielle is currently under the care of Radha Rouse. She will return to work/school on: 2/11/20 If there are questions or concerns please have the patient contact our office. Sincerely, Isadora Huerta MD

## 2020-02-10 NOTE — PROGRESS NOTES
HISTORY OF PRESENT ILLNESS  Gilles Leigh is a 77 y.o. female. /82 (BP 1 Location: Left arm, BP Patient Position: Sitting)   Pulse 78   Temp 97.5 °F (36.4 °C) (Oral)   Resp 16   Ht 5' 8\" (1.727 m)   Wt 184 lb (83.5 kg)   SpO2 98%   BMI 27.98 kg/m²           Current Outpatient Medications:  benzonatate (TESSALON) 200 mg capsule, Take 1 Cap by mouth three (3) times daily as needed for Cough for up to 7 days. cyclobenzaprine (FLEXERIL) 10 mg tablet, Take 1 Tab by mouth three (3) times daily as needed for Muscle Spasm(s). hydroCHLOROthiazide (MICROZIDE) 12.5 mg capsule, TAKE 1 CAPSULE BY MOUTH DAILY  levothyroxine (SYNTHROID) 125 mcg tablet, Take 1 Tab by mouth Daily (before breakfast). amLODIPine-benazepril (LOTREL) 5-20 mg per capsule, Take 1 Cap by mouth daily. ibuprofen (MOTRIN) 800 mg tablet, Take 1 Tab by mouth every eight (8) hours as needed for Pain. Cough   The history is provided by the patient. This is a new problem. The current episode started more than 2 days ago. The problem occurs daily. The problem has not changed since onset. Nothing aggravates the symptoms. Nothing relieves the symptoms. Treatments tried: lozenges. Thyroid Problem   The history is provided by the patient. This is a chronic problem. The current episode started more than 1 week ago. The problem occurs daily. The symptoms are relieved by medications. Review of Systems   Constitutional: Negative for chills and fever. HENT: Positive for congestion and sore throat. Respiratory: Positive for cough (dry hacking ). Physical Exam  HENT:      Right Ear: Tympanic membrane, ear canal and external ear normal.      Left Ear: Tympanic membrane, ear canal and external ear normal.      Nose: Mucosal edema present. Right Sinus: No maxillary sinus tenderness or frontal sinus tenderness. Left Sinus: No maxillary sinus tenderness or frontal sinus tenderness.    Cardiovascular:      Rate and Rhythm: Normal rate and regular rhythm. Pulses: Normal pulses. Heart sounds: Normal heart sounds. Pulmonary:      Effort: Pulmonary effort is normal.      Breath sounds: Normal breath sounds. Lymphadenopathy:      Cervical: No cervical adenopathy. ASSESSMENT and PLAN    ICD-10-CM ICD-9-CM    4. Benign hypertension without CHF P69 104.1 METABOLIC PANEL, COMPREHENSIVE      LIPID PANEL   5. Hypothyroidism, unspecified type E03.9 244.9 TSH AND FREE T4   6. Need for hepatitis C screening test Z11.59 V73.89 HCV AB W/RFLX TO AALIYAH         BP controlled    Discussed BMI/weight, lifestyle, diet and exercise. Discussed effect on blood pressure, blood sugar, and joints especially  Focus on limiting white carbs, portion control, and moving more.   She is now going to the [facility] to work out    Update lab    Cough--probably viral. Send cough medication  Lozenges,  Fluids, etc    F/u 6 months for regular f/u for HTN,thyroid

## 2020-02-10 NOTE — PROGRESS NOTES
ROOM # 6    Mario Mcconnell presents today for No chief complaint on file. Mario Mcconnell preferred language for health care discussion is english/other. Is someone accompanying this pt? no    Is the patient using any DME equipment during [address]? no    Depression Screening:  3 most recent PHQ Screens 6/28/2018 3/13/2018   Little interest or pleasure in doing things Several days Not at all   Feeling down, depressed, irritable, or hopeless Several days Several days   Total Score PHQ 2 2 1       Learning Assessment:  Learning Assessment 3/13/2018   PRIMARY LEARNER Patient   HIGHEST LEVEL OF EDUCATION - PRIMARY LEARNER  4 YEARS OF COLLEGE   BARRIERS PRIMARY LEARNER NONE   CO-LEARNER CAREGIVER No   PRIMARY LANGUAGE ENGLISH    NEED No   LEARNER PREFERENCE PRIMARY OTHER (COMMENT)   LEARNING SPECIAL TOPICS no   ANSWERED BY self   RELATIONSHIP SELF   ASSESSMENT COMMENT no       Abuse Screening:  Abuse Screening Questionnaire 6/28/2018 3/13/2018   Do you ever feel afraid of your partner? N N   Are you in a relationship with someone who physically or mentally threatens you? N N   Is it safe for you to go home? Y Y       Fall Risk  Fall Risk Assessment, last 12 mths 6/28/2018 3/13/2018   Able to walk? Yes Yes   Fall in past 12 months? No No           Health Maintenance reviewed and discussed per provider. Yes    Mario Mcconnell is due for   Health Maintenance Due   Topic Date Due    Hepatitis C Screening  1950    DTaP/Tdap/Td series (1 - Tdap) 06/03/1962    Shingrix Vaccine Age 50> (1 of 2) 06/03/2001    GLAUCOMA SCREENING Q2Y  06/03/2016    Pneumococcal 65+ years (1 of 1 - PPSV23) 06/03/2016    Medicare Yearly Exam  03/14/2018    Influenza Age 9 to Adult  08/01/2019     Please order/place referral if appropriate. Advance Directive:  1. Do you have an advance directive in place? Patient Reply: no    2. If not, would you like material regarding how to put one in place?  Patient Reply: no    Coordination of Care:  1. Have you been to the ER, urgent care clinic since your last visit? Hospitalized since your last visit? no    2. Have you seen or consulted any other health care providers outside of the [address] [place] [address] since your last visit? Include any pap smears or colon screening.  no

## 2020-02-10 NOTE — PROGRESS NOTES
This is the Subsequent Medicare Annual Wellness Exam, performed 12 months or more after the Initial AWV or the last Subsequent AWV    I have reviewed the patient's medical history in detail and updated the computerized patient record. History     Patient Active Problem List   Diagnosis Code    Hallux rigidus M20.20    Osteoarthrosis, unspecified whether generalized or localized, ankle and foot M19.079    Post-operative state Z98.890    Postoperative hypothyroidism E89.0    Essential hypertension I10     Past Medical History:   Diagnosis Date    Carpal tunnel syndrome 2005    left wrist    Colon polyp 07/30/2018    cecal, adenoma, 5 yr f/u    Degenerative arthritis     Goiter     Hypertension 2005    Hypothyroid 2011    post-surgical.     Migraines       Past Surgical History:   Procedure Laterality Date    COLONOSCOPY N/A 7/30/2018    [facility]    HX COLONOSCOPY  07/30/2018    Dr. Pepe Manning    HX ORTHOPAEDIC Left     fractured great toe requiring fusion.  HX THYROIDECTOMY  2011     Current Outpatient Medications   Medication Sig Dispense Refill    cyclobenzaprine (FLEXERIL) 10 mg tablet Take 1 Tab by mouth three (3) times daily as needed for Muscle Spasm(s). 90 Tab 2    hydroCHLOROthiazide (MICROZIDE) 12.5 mg capsule TAKE 1 CAPSULE BY MOUTH DAILY 90 Cap 1    levothyroxine (SYNTHROID) 125 mcg tablet Take 1 Tab by mouth Daily (before breakfast). 90 Tab 3    amLODIPine-benazepril (LOTREL) 5-20 mg per capsule Take 1 Cap by mouth daily.  ibuprofen (MOTRIN) 800 mg tablet Take 1 Tab by mouth every eight (8) hours as needed for Pain.  30 Tab 0     Allergies   Allergen Reactions    Cocoa Hives     Also becomes dizzy    Codeine Hives    Pcn [Penicillins] Hives       Family History   Problem Relation Age of Onset    Other Mother 68        leukemia    Alzheimer Father 80    Cancer Father         prostate    Cancer Brother prostate    Hypertension Brother     Hypertension Sister     Hypertension Sister     Hypertension Sister     Diabetes Sister     Diabetes Sister     Alzheimer Sister      Social History     Tobacco Use    Smoking status: Never Smoker    Smokeless tobacco: Never Used   Substance Use Topics    Alcohol use: Yes     Comment: social       Depression Risk Factor Screening:     3 most recent PHQ Screens 2/10/2020   Little interest or pleasure in doing things Not at all   Feeling down, depressed, irritable, or hopeless Not at all   Total Score PHQ 2 0       Alcohol Risk Factor Screening:   Do you average 1 drink per night or more than 7 drinks a week:  No    On any one occasion in the past three months have you have had more than 3 drinks containing alcohol:  No      Functional Ability and Level of Safety:   Hearing: pt is concerned enough to ask for a referral    Activities of Daily Living: The home contains: no safety equipment. Patient does total self care    Ambulation: with mild difficulty    Fall Risk:  Fall Risk Assessment, last 12 mths 2/10/2020   Able to walk? Yes   Fall in past 12 months? No       Abuse Screen:  Patient is not abused    Cognitive Screening   Has your family/caregiver stated any concerns about your memory: no  Cognitive Screening: Normal - Mini Cog Test,  NML animal naming    Patient Care Team   Patient Care Team:  Enma Casillas MD as PCP - General (Internal Medicine)  Enma Casillas MD as PCP - REHABILITATION [facility] Empaneled Provider    Assessment/Plan   Education and counseling provided:  Are appropriate based on today's review and evaluation    Diagnoses and all orders for this visit:    1. Medicare annual wellness visit, subsequent    2. Encounter for screening mammogram for malignant neoplasm of breast  -     RAVIN MAMMO BI SCREENING INCL CAD; Future    3.  Encounter for immunization  -     ADMIN PNEUMOCOCCAL VACCINE  -     PNEUMOCOCCAL POLYSACCHARIDE VACCINE, 23-VALENT, ADULT OR IMMUNOSUPPRESSED PT DOSE,        Health Maintenance Due   Topic Date Due    Hepatitis C Screening  1950    GLAUCOMA SCREENING Q2Y  06/03/2016    Pneumococcal 65+ years (1 of 1 - PPSV23) 06/03/2016    Medicare Yearly Exam  03/14/2018

## 2020-02-10 NOTE — PROGRESS NOTES
PPD Placement note  Darya Mejia, 77 y.o. female is here today for placement of PPD test  Reason for PPD test: employer  Pt taken PPD test before: yes  Verified in allergy area and with patient that they are not allergic to the products PPD is made of (Phenol or Tween). Yes  Is patient taking any oral or IV steroid medication now or have they taken it in the last month? no  Has the patient ever received the BCG vaccine?: no  Has the patient been in recent contact with anyone known or suspected of having active TB disease?: no       Date of exposure (if applicable): n/a       Name of person they were exposed to (if applicable): n/a  Patient's Country of origin?: [place]  O: Alert and oriented in NAD. P:  PPD placed on 2/10/2020. Patient advised to return for reading within 48-72 hours.

## 2020-02-10 NOTE — TELEPHONE ENCOUNTER
That should cost less than $15 if she pays cash. I don't have anything else I can safely prescribed since she is allergic to codeine.

## 2020-02-12 ENCOUNTER — DOCUMENTATION ONLY (OUTPATIENT)
Dept: INTERNAL MEDICINE CLINIC | Age: 70
End: 2020-02-12

## 2020-02-12 DIAGNOSIS — Z11.1 SCREENING FOR TUBERCULOSIS: ICD-10-CM

## 2020-02-12 LAB
HCV AB S/CO SERPL IA: <0.1 S/CO RATIO (ref 0–0.9)
HCV AB SERPL QL IA: NORMAL
MM INDURATION POC: 0 MM (ref 0–5)
PPD POC: NEGATIVE NEGATIVE

## 2020-02-14 DIAGNOSIS — R77.8 ABNORMAL SERUM PROTEIN TEST: ICD-10-CM

## 2020-02-14 DIAGNOSIS — R77.8 ABNORMAL SERUM PROTEIN TEST: Primary | ICD-10-CM

## 2020-03-08 RX ORDER — HYDROCHLOROTHIAZIDE 12.5 MG/1
CAPSULE ORAL
Qty: 90 CAP | Refills: 1 | Status: SHIPPED | OUTPATIENT
Start: 2020-03-08 | End: 2020-09-02

## 2020-03-23 ENCOUNTER — TELEPHONE (OUTPATIENT)
Dept: INTERNAL MEDICINE CLINIC | Age: 70
End: 2020-03-23

## 2020-03-23 ENCOUNTER — OFFICE VISIT (OUTPATIENT)
Dept: INTERNAL MEDICINE CLINIC | Age: 70
End: 2020-03-23

## 2020-03-23 VITALS
DIASTOLIC BLOOD PRESSURE: 148 MMHG | HEART RATE: 77 BPM | OXYGEN SATURATION: 100 % | RESPIRATION RATE: 20 BRPM | BODY MASS INDEX: 27.98 KG/M2 | HEIGHT: 68 IN | TEMPERATURE: 97.7 F | SYSTOLIC BLOOD PRESSURE: 170 MMHG

## 2020-03-23 DIAGNOSIS — R68.89 FLU-LIKE SYMPTOMS: Primary | ICD-10-CM

## 2020-03-23 DIAGNOSIS — R19.7 DIARRHEA, UNSPECIFIED TYPE: ICD-10-CM

## 2020-03-23 LAB
FLUAV+FLUBV AG NOSE QL IA.RAPID: NEGATIVE POS/NEG
FLUAV+FLUBV AG NOSE QL IA.RAPID: NEGATIVE POS/NEG
VALID INTERNAL CONTROL?: YES

## 2020-03-23 RX ORDER — AZITHROMYCIN 250 MG/1
TABLET, FILM COATED ORAL
Qty: 6 TAB | Refills: 0 | Status: SHIPPED | OUTPATIENT
Start: 2020-03-23 | End: 2020-08-14

## 2020-03-23 RX ORDER — DIPHENOXYLATE HYDROCHLORIDE AND ATROPINE SULFATE 2.5; .025 MG/1; MG/1
1 TABLET ORAL
Qty: 30 TAB | Refills: 1 | Status: SHIPPED | OUTPATIENT
Start: 2020-03-23 | End: 2020-08-14

## 2020-03-23 NOTE — PROGRESS NOTES
HISTORY OF PRESENT ILLNESS  Sofia Rouse is a 77 y.o. female. Visit Vitals  BP (!) 170/148 (BP 1 Location: Right arm, BP Patient Position: Sitting)   Pulse 77   Temp 97.7 °F (36.5 °C) (Oral)   Resp 20   Ht 5' 8\" (1.727 m)   SpO2 100%   BMI 27.98 kg/m²       Pt has 4-5 day h/o flu like sxs. No fevers, fatigue, hot and cold, some body aches. No cough. Sore throat      She also has been having some GI sxs--diarrhea. A little better today. Has been liquidy, Has started to firm up some  Denies eating out recently. Cooks for herself. Did have a frozen dinner before this started. It was a packaged dinner. Not aware of anyone with GI illness. Sweats    The history is provided by the patient (comments). This is a new problem. Associated symptoms include diarrhea. Head Pain      Sleep Problem   Associated symptoms include abdominal pain. Diarrhea    Associated symptoms include abdominal pain and sweats. Abdominal Pain   Associated symptoms include abdominal pain. Review of Systems   Gastrointestinal: Positive for abdominal pain and diarrhea. Physical Exam  Vitals signs and nursing note reviewed. Constitutional:       Appearance: She is well-developed. HENT:      Right Ear: Tympanic membrane, ear canal and external ear normal.      Left Ear: Tympanic membrane, ear canal and external ear normal.      Nose: Congestion present. Mouth/Throat:      Mouth: Mucous membranes are moist.   Eyes:      Conjunctiva/sclera: Conjunctivae normal.   Cardiovascular:      Rate and Rhythm: Normal rate and regular rhythm. Pulmonary:      Effort: Pulmonary effort is normal.      Breath sounds: Normal breath sounds. Abdominal:      General: There is no distension. Tenderness: There is no abdominal tenderness. Skin:     General: Skin is warm and dry. Neurological:      General: No focal deficit present. Mental Status: She is alert and oriented to person, place, and time.    Psychiatric:         Mood and Affect: Mood normal.         ASSESSMENT and PLAN    ICD-10-CM ICD-9-CM    1. Flu-like symptoms R68.89 780.99 AMB POC RAPID INFLUENZA TEST      azithromycin (ZITHROMAX) 250 mg tablet   2. Diarrhea, unspecified type R19.7 787.91 diphenoxylate-atropine (LomotiL) 2.5-0.025 mg per tablet     Rapid flu is negative    GI sxs suggest viral etiology    Will tx with lomotil for loose stools  Add back up zpak for any further respiratory sxs    F/u prn or as appointed    discussed COVID-19 precautions.   Strict handwashing, avoiding crowds, staying at home, social isolation, etc.

## 2020-03-23 NOTE — PROGRESS NOTES
ROOM # 4    Darya Mejia presents today for   Chief Complaint   Patient presents with    Sweats    Head Pain    Sleep Problem       Darya Mejia preferred language for health care discussion is english/other. Is someone accompanying this pt? no    Is the patient using any DME equipment during [address]? no    Depression Screening:  3 most recent PHQ Screens 2/10/2020 6/28/2018 3/13/2018   Little interest or pleasure in doing things Not at all Several days Not at all   Feeling down, depressed, irritable, or hopeless Not at all Several days Several days   Total Score PHQ 2 0 2 1       Learning Assessment:  Learning Assessment 3/13/2018   PRIMARY LEARNER Patient   HIGHEST LEVEL OF EDUCATION - PRIMARY LEARNER  4 YEARS OF COLLEGE   BARRIERS PRIMARY LEARNER NONE   CO-LEARNER CAREGIVER No   PRIMARY LANGUAGE ENGLISH    NEED No   LEARNER PREFERENCE PRIMARY OTHER (COMMENT)   LEARNING SPECIAL TOPICS no   ANSWERED BY self   RELATIONSHIP SELF   ASSESSMENT COMMENT no       Abuse Screening:  Abuse Screening Questionnaire 2/10/2020 6/28/2018 3/13/2018   Do you ever feel afraid of your partner? N N N   Are you in a relationship with someone who physically or mentally threatens you? N N N   Is it safe for you to go home? Marylen Bath       Fall Risk  Fall Risk Assessment, last 12 mths 2/10/2020 6/28/2018 3/13/2018   Able to walk? Yes Yes Yes   Fall in past 12 months? No No No           Health Maintenance reviewed and discussed per provider. Yes    Darya Mejia is due for   Health Maintenance Due   Topic Date Due    Breast Cancer Screen Mammogram  04/02/2020     Please order/place referral if appropriate. Advance Directive:  1. Do you have an advance directive in place? Patient Reply: no    2. If not, would you like material regarding how to put one in place? Patient Reply: no    Coordination of Care:  1. Have you been to the ER, urgent care clinic since your last visit? Hospitalized since your last visit? no    2.  Have you seen or consulted any other health care providers outside of the [address] [place] [address] since your last visit? Include any pap smears or colon screening.  no

## 2020-03-23 NOTE — PATIENT INSTRUCTIONS

## 2020-03-23 NOTE — TELEPHONE ENCOUNTER
Called pt in regards to appointment today. If pt does have flu symptoms, pt would need to go to flu clinic([facility]) or the nearest ER. Left message asking for a return call.

## 2020-06-23 DIAGNOSIS — E03.9 HYPOTHYROIDISM, UNSPECIFIED TYPE: ICD-10-CM

## 2020-06-23 RX ORDER — LEVOTHYROXINE SODIUM 125 UG/1
125 TABLET ORAL
Qty: 90 TAB | Refills: 3 | Status: SHIPPED | OUTPATIENT
Start: 2020-06-23 | End: 2021-03-16

## 2020-08-14 ENCOUNTER — OFFICE VISIT (OUTPATIENT)
Dept: INTERNAL MEDICINE CLINIC | Age: 70
End: 2020-08-14

## 2020-08-14 VITALS
WEIGHT: 176 LBS | HEIGHT: 68 IN | OXYGEN SATURATION: 100 % | DIASTOLIC BLOOD PRESSURE: 64 MMHG | TEMPERATURE: 96.4 F | RESPIRATION RATE: 20 BRPM | BODY MASS INDEX: 26.67 KG/M2 | HEART RATE: 61 BPM | SYSTOLIC BLOOD PRESSURE: 126 MMHG

## 2020-08-14 DIAGNOSIS — G43.819 OTHER MIGRAINE WITHOUT STATUS MIGRAINOSUS, INTRACTABLE: Primary | ICD-10-CM

## 2020-08-14 RX ORDER — KETOROLAC TROMETHAMINE 30 MG/ML
30 INJECTION, SOLUTION INTRAMUSCULAR; INTRAVENOUS ONCE
Qty: 1 VIAL | Refills: 0
Start: 2020-08-14 | End: 2020-08-14

## 2020-08-14 RX ORDER — BUTALBITAL, ACETAMINOPHEN AND CAFFEINE 50; 325; 40 MG/1; MG/1; MG/1
1 TABLET ORAL
Qty: 30 TAB | Refills: 2 | Status: SHIPPED | OUTPATIENT
Start: 2020-08-14 | End: 2020-08-14 | Stop reason: SDUPTHER

## 2020-08-14 RX ORDER — BUTALBITAL, ACETAMINOPHEN AND CAFFEINE 50; 325; 40 MG/1; MG/1; MG/1
1 TABLET ORAL
Qty: 30 TAB | Refills: 2 | Status: SHIPPED | OUTPATIENT
Start: 2020-08-14 | End: 2020-12-02

## 2020-08-14 NOTE — PROGRESS NOTES
HISTORY OF PRESENT ILLNESS  Diaz More is a 72 y.o. female. Visit Vitals  /64 (BP 1 Location: Left arm, BP Patient Position: Sitting)   Pulse 61   Temp (!) 96.4 °F (35.8 °C) (Oral)   Resp 20   Ht 5' 8\" (1.727 m)   Wt 176 lb (79.8 kg)   SpO2 100%   BMI 26.76 kg/m²       For 2-3 weeks, she had had intermittent headaches. The last 4 days it has been constant. Does not think it is the weather. One problem is her upstairs neighbors who smoke (cigs and MJ) and whose kids make a lot of noise. The stress of this is causing the headaches to be worse. Throbbing pain from the back of her head and over the top to behind her eyes. Her vision is blurry. Headache   The history is provided by the patient. This is a new problem. The current episode started more than 1 week ago. The problem occurs daily. The problem has been gradually worsening. Associated symptoms include headaches. Pertinent negatives include no chest pain and no shortness of breath. Review of Systems   Constitutional: Negative for chills and fever. Respiratory: Negative for shortness of breath. Cardiovascular: Negative for chest pain and palpitations. Gastrointestinal: Negative for vomiting. Neurological: Positive for headaches. Negative for dizziness. Physical Exam  Vitals signs and nursing note reviewed. Constitutional:       Appearance: Normal appearance. She is well-developed. HENT:      Head: Normocephalic and atraumatic. Right Ear: Tympanic membrane, ear canal and external ear normal.      Left Ear: Tympanic membrane, ear canal and external ear normal.      Nose: Nose normal.      Mouth/Throat:      Mouth: Mucous membranes are dry. Eyes:      Extraocular Movements: Extraocular movements intact. Pupils: Pupils are equal, round, and reactive to light. Comments: Mild photophobia   Cardiovascular:      Rate and Rhythm: Normal rate.    Pulmonary:      Effort: Pulmonary effort is normal.   Skin: General: Skin is warm and dry. Neurological:      General: No focal deficit present. Mental Status: She is alert and oriented to person, place, and time. Psychiatric:         Mood and Affect: Mood normal.         Behavior: Behavior normal.         ASSESSMENT and PLAN    ICD-10-CM ICD-9-CM    1. Other migraine without status migrainosus, intractable  G43.819 346.81 butalbital-acetaminophen-caffeine (FIORICET, ESGIC) -40 mg per tablet      ketorolac (TORADOL) 30 mg/mL (1 mL) injection      KETOROLAC TROMETHAMINE INJ      SD THER/PROPH/DIAG INJECTION, SUBCUT/IM       This sounds like a mixed headache.  There is a vascular component as well as tension in muscles in the neck    Will start fiorinal  toradol today    F/u as appointed or prn

## 2020-08-14 NOTE — PROGRESS NOTES
ROOM # 6    Ashley Villegas presents today for   Chief Complaint   Patient presents with    Head Pain       Ashley Villegas preferred language for health care discussion is english/other. Is someone accompanying this pt? no    Is the patient using any DME equipment during [address]? no    Depression Screening:  3 most recent PHQ Screens 2/10/2020 6/28/2018 3/13/2018   Little interest or pleasure in doing things Not at all Several days Not at all   Feeling down, depressed, irritable, or hopeless Not at all Several days Several days   Total Score PHQ 2 0 2 1       Learning Assessment:  Learning Assessment 3/13/2018   PRIMARY LEARNER Patient   HIGHEST LEVEL OF EDUCATION - PRIMARY LEARNER  4 YEARS OF COLLEGE   BARRIERS PRIMARY LEARNER NONE   CO-LEARNER CAREGIVER No   PRIMARY LANGUAGE ENGLISH    NEED No   LEARNER PREFERENCE PRIMARY OTHER (COMMENT)   LEARNING SPECIAL TOPICS no   ANSWERED BY self   RELATIONSHIP SELF   ASSESSMENT COMMENT no       Abuse Screening:  Abuse Screening Questionnaire 2/10/2020 6/28/2018 3/13/2018   Do you ever feel afraid of your partner? N N N   Are you in a relationship with someone who physically or mentally threatens you? N N N   Is it safe for you to go home? Sher Tai       Fall Risk  Fall Risk Assessment, last 12 mths 2/10/2020 6/28/2018 3/13/2018   Able to walk? Yes Yes Yes   Fall in past 12 months? No No No           Health Maintenance reviewed and discussed per provider. Yes    Ashley Villegas is due for   Health Maintenance Due   Topic Date Due    Breast Cancer Screen Mammogram  04/02/2020    Influenza Age 5 to Adult  08/01/2020     Please order/place referral if appropriate. Advance Directive:  1. Do you have an advance directive in place? Patient Reply: no    2. If not, would you like material regarding how to put one in place? Patient Reply: no    Coordination of Care:  1. Have you been to the ER, urgent care clinic since your last visit? Hospitalized since your last visit? no    2. Have you seen or consulted any other health care providers outside of the [address] [place] [address] since your last visit? Include any pap smears or colon screening.  no

## 2020-09-02 RX ORDER — HYDROCHLOROTHIAZIDE 12.5 MG/1
CAPSULE ORAL
Qty: 90 CAP | Refills: 1 | Status: SHIPPED | OUTPATIENT
Start: 2020-09-02 | End: 2020-12-21

## 2020-12-02 DIAGNOSIS — G43.819 OTHER MIGRAINE WITHOUT STATUS MIGRAINOSUS, INTRACTABLE: ICD-10-CM

## 2020-12-02 RX ORDER — BUTALBITAL, ACETAMINOPHEN AND CAFFEINE 50; 325; 40 MG/1; MG/1; MG/1
TABLET ORAL
Qty: 30 TAB | Refills: 2 | Status: SHIPPED | OUTPATIENT
Start: 2020-12-02

## 2020-12-21 RX ORDER — HYDROCHLOROTHIAZIDE 12.5 MG/1
CAPSULE ORAL
Qty: 90 CAP | Refills: 1 | Status: SHIPPED | OUTPATIENT
Start: 2020-12-21

## 2021-03-16 DIAGNOSIS — E03.9 HYPOTHYROIDISM, UNSPECIFIED TYPE: ICD-10-CM

## 2021-03-16 RX ORDER — LEVOTHYROXINE SODIUM 125 UG/1
TABLET ORAL
Qty: 90 TAB | Refills: 3 | Status: SHIPPED | OUTPATIENT
Start: 2021-03-16

## 2021-04-27 ENCOUNTER — TELEPHONE (OUTPATIENT)
Dept: INTERNAL MEDICINE CLINIC | Age: 71
End: 2021-04-27

## 2021-04-28 NOTE — TELEPHONE ENCOUNTER
butalbital-acetaminophen-caffeine (FIORICET, ESGIC) -40 mg per tablet was approved      PA Case: [id number], Status: Approved, Coverage Starts on: 1/27/2021 to Coverage Ends on: 4/28/2022.

## 2021-09-12 RX ORDER — HYDROCHLOROTHIAZIDE 12.5 MG/1
CAPSULE ORAL
Qty: 90 CAPSULE | Refills: 1 | OUTPATIENT
Start: 2021-09-12

## (undated) DEVICE — KENDALL 500 SERIES DIAPHORETIC FOAM MONITORING ELECTRODE - TEAR DROP SHAPE ( 30/PK): Brand: KENDALL

## (undated) DEVICE — SNARE ENDO 2.4MMX230CM -- COLD EXACTO

## (undated) DEVICE — BASIN EMESIS 500CC ROSE 250/CS 60/PLT: Brand: MEDEGEN MEDICAL PRODUCTS, LLC

## (undated) DEVICE — CONTAINER PREFIL FRMLN 15ML --

## (undated) DEVICE — FLEX ADVANTAGE 1500CC: Brand: FLEX ADVANTAGE

## (undated) DEVICE — KIT COLON W/ 1.1OZ LUB AND 2 END

## (undated) DEVICE — SYR 50ML SLIP TIP NSAF LF STRL --

## (undated) DEVICE — MEDI-VAC NON-CONDUCTIVE SUCTION TUBING: Brand: CARDINAL HEALTH

## (undated) DEVICE — DEVICE INFL 60ML 12ATM CONVENIENT LOK REL HNDL HI PRSS FLX

## (undated) DEVICE — TRAP SPEC COLL POLYP POLYSTYR --